# Patient Record
Sex: FEMALE | Race: WHITE
[De-identification: names, ages, dates, MRNs, and addresses within clinical notes are randomized per-mention and may not be internally consistent; named-entity substitution may affect disease eponyms.]

---

## 2021-06-23 ENCOUNTER — HOSPITAL ENCOUNTER (EMERGENCY)
Dept: HOSPITAL 41 - JD.ED | Age: 46
Discharge: HOME | End: 2021-06-23
Payer: MEDICAID

## 2021-06-23 DIAGNOSIS — N30.01: Primary | ICD-10-CM

## 2021-06-23 DIAGNOSIS — Z88.0: ICD-10-CM

## 2021-06-23 PROCEDURE — 36415 COLL VENOUS BLD VENIPUNCTURE: CPT

## 2021-06-23 PROCEDURE — 81001 URINALYSIS AUTO W/SCOPE: CPT

## 2021-06-23 PROCEDURE — 80053 COMPREHEN METABOLIC PANEL: CPT

## 2021-06-23 PROCEDURE — 99283 EMERGENCY DEPT VISIT LOW MDM: CPT

## 2021-06-23 PROCEDURE — 96365 THER/PROPH/DIAG IV INF INIT: CPT

## 2021-06-23 PROCEDURE — 87086 URINE CULTURE/COLONY COUNT: CPT

## 2021-06-23 NOTE — EDM.PDOC
ED HPI GENERAL MEDICAL PROBLEM





- General


Chief Complaint: Genitourinary Problem


Stated Complaint: UNABLE TO URINATE


Time Seen by Provider: 06/23/21 15:05


Source of Information: Reports: Patient, RN Notes Reviewed


History Limitations: Reports: No Limitations





- History of Present Illness


INITIAL COMMENTS - FREE TEXT/NARRATIVE: 





Patient is a 46-year-old female who presents to the ER for the evaluation of a 

possible UTI.  Patient notes she gets UTIs fairly frequently, the last one was 

roughly 2 months ago.  She states she is having dysuria, and only dribbles a 

little bit when she has to go to the bathroom.  Patient notes that she woke up 

early this morning, and that the road dorsal morning, she thinks she could be 

somewhat dehydrated as well however she was drinking Pedialyte all day in order 

to counteract the effects of being outside in the heat.  She does feel a bit 

dizzy or lightheaded, feels like her mouth is dry.  She is denying any fevers or

chills, cough or shortness of breath, nausea/vomiting/diarrhea.  Patient states 

it hurts quite a bit to urinate, so she became concerned and came to the ER.


  ** Groin


Pain Score (Numeric/FACES): 8





- Related Data


                                    Allergies











Allergy/AdvReac Type Severity Reaction Status Date / Time


 


amoxicillin Allergy Severe Anaphylactic Verified 06/23/21 15:08





   Shock  











Home Meds: 


                                    Home Meds





Cefdinir [Omnicef] 300 mg PO BID 7 Days #14 cap 06/23/21 [Rx]


Dextroamphetamine/Amphetamine [Adderall 20 mg Tablet] 1 tab PO TID 06/23/21 

[History]


LORazepam [Ativan] 0.5 mg PO BID PRN 06/23/21 [History]











Past Medical History


Genitourinary History: Reports: UTI, Recurrent


Psychiatric History: Reports: ADHD, Anxiety





- Past Surgical History


GI Surgical History: Reports: Appendectomy


Female  Surgical History: Reports: Hysterectomy


Musculoskeletal Surgical History: Reports: Carpal Tunnel, Other (See Below)


Other Musculoskeletal Surgeries/Procedures:: Foot Surgery





Social & Family History





- Tobacco Use


Tobacco Use Status *Q: Never Tobacco User





- Caffeine Use


Caffeine Use: Reports: Soda





- Recreational Drug Use


Recreational Drug Use: No





ED ROS GENERAL





- Review of Systems


Review Of Systems: Comprehensive ROS is negative, except as noted in HPI.





ED EXAM, RENAL/





- Physical Exam


Exam: See Below


Exam Limited By: No Limitations


General Appearance: Alert, WD/WN, No Apparent Distress


Respiratory/Chest: No Respiratory Distress, Lungs Clear, Normal Breath Sounds, 

No Accessory Muscle Use, Chest Non-Tender


Cardiovascular: Normal Peripheral Pulses, Regular Rate, Rhythm, No Edema


GI/Abdominal: Normal Bowel Sounds, Soft, No Distention, No Mass, Tender 

(suprapubic tenderness)


 (Female) Exam: Deferred


Extremities: Normal Inspection, Normal Capillary Refill


Neurological: Alert, Oriented, Normal Cognition, No Motor/Sensory Deficits


Psychiatric: Normal Affect, Normal Mood


Skin Exam: Warm, Dry, Intact, Normal Color, No Rash





Course





- Vital Signs


Last Recorded V/S: 


                                Last Vital Signs











Temp  97.4 F   06/23/21 15:05


 


Pulse  87   06/23/21 15:05


 


Resp  16   06/23/21 15:05


 


BP  127/74   06/23/21 15:05


 


Pulse Ox  97   06/23/21 15:05














- Orders/Labs/Meds


Orders: 


                               Active Orders 24 hr











 Category Date Time Status


 


 Peripheral IV Care [RC] .AS DIRECTED Care  06/23/21 15:18 Ordered


 


 Sodium Chloride 0.9% [Saline Flush] Med  06/23/21 15:18 Ordered





 10 ml FLUSH ASDIRECTED PRN   


 


 cefTRIAXone [Rocephin] 2 gm Med  06/23/21 15:54 Ordered





 Sodium Chloride 0.9% [Normal Saline] 100 ml   





 IV ONETIME   


 


 Peripheral IV Insertion Adult [OM.PC] Routine Oth  06/23/21 15:18 Ordered








                                Medication Orders





Ceftriaxone Sodium 2 gm/ (Sodium Chloride)  100 mls @ 200 mls/hr IV ONETIME ONE


   Stop: 06/23/21 16:23


   Last Admin: 06/23/21 16:02  Dose: 200 mls/hr


   Documented by: AMANDEEP


Sodium Chloride (Sodium Chloride 0.9% 10 Ml Syringe)  10 ml FLUSH ASDIRECTED PRN


   PRN Reason: Keep Vein Open


   Last Admin: 06/23/21 15:33  Dose: 10 ml


   Documented by: NABILA








Labs: 


                                Laboratory Tests











  06/23/21 06/23/21 Range/Units





  15:30 15:34 


 


Sodium   139  (136-145)  mEq/L


 


Potassium   3.3 L  (3.5-5.1)  mEq/L


 


Chloride   102  ()  mEq/L


 


Carbon Dioxide   27  (21-32)  mEq/L


 


Anion Gap   13.3  (5-15)  


 


BUN   9  (7-18)  mg/dL


 


Creatinine   0.9  (0.55-1.02)  mg/dL


 


Est Cr Clr Drug Dosing   73.12  mL/min


 


Estimated GFR (MDRD)   > 60  (>60)  mL/min


 


BUN/Creatinine Ratio   10.0 L  (14-18)  


 


Glucose   78  (70-99)  mg/dL


 


Calcium   8.3 L  (8.5-10.1)  mg/dL


 


Total Bilirubin   0.4  (0.2-1.0)  mg/dL


 


AST   27  (15-37)  U/L


 


ALT   23  (14-59)  U/L


 


Alkaline Phosphatase   75  ()  U/L


 


Total Protein   7.2  (6.4-8.2)  g/dl


 


Albumin   4.0  (3.4-5.0)  g/dl


 


Globulin   3.2  gm/dL


 


Albumin/Globulin Ratio   1.3  (1-2)  


 


Urine Color  Yellow   (Yellow)  


 


Urine Appearance  Cloudy H   (Clear)  


 


Urine pH  5.5   (5.0-8.0)  


 


Ur Specific Gravity  > or = 1.030   (1.005-1.030)  


 


Urine Protein  2+ H   (Negative)  


 


Urine Glucose (UA)  Negative   (Negative)  


 


Urine Ketones  1+ H   (Negative)  


 


Urine Occult Blood  2+ H   (Negative)  


 


Urine Nitrite  Positive H   (Negative)  


 


Urine Bilirubin  Negative   (Negative)  


 


Urine Urobilinogen  0.2   (0.2-1.0)  


 


Ur Leukocyte Esterase  2+ H   (Negative)  


 


Urine RBC  40-50 H   (0-5)  /hpf


 


Urine WBC  50-75 H   (0-5)  /hpf


 


Ur Squamous Epith Cells  5-10 H   (0-5)  /hpf


 


Urine Bacteria  Moderate H   (FEW)  /hpf


 


Urine Mucus  Moderate H   (FEW)  /hpf











Meds: 


Medications











Generic Name Dose Route Start Last Admin





  Trade Name Freq  PRN Reason Stop Dose Admin


 


Ceftriaxone Sodium 2 gm/  100 mls @ 200 mls/hr  06/23/21 15:54  06/23/21 16:02





  Sodium Chloride  IV  06/23/21 16:23  200 mls/hr





  ONETIME ONE   Administration


 


Sodium Chloride  10 ml  06/23/21 15:18  06/23/21 15:33





  Sodium Chloride 0.9% 10 Ml Syringe  FLUSH   10 ml





  ASDIRECTED PRN   Administration





  Keep Vein Open  














Discontinued Medications














Generic Name Dose Route Start Last Admin





  Trade Name Francine  PRN Reason Stop Dose Admin


 


Sodium Chloride  1,000 mls @ 999 mls/hr  06/23/21 15:18  06/23/21 15:33





  Normal Saline  IV  06/23/21 16:18  999 mls/hr





  ONETIME ONE   Administration


 


Phenazopyridine HCl  95 mg  06/23/21 15:19  06/23/21 15:33





  Phenazopyridine 95 Mg Tab  PO  06/23/21 15:20  95 mg





  ONETIME ONE   Administration














- Re-Assessments/Exams


Free Text/Narrative Re-Assessment/Exam: 





06/23/21 15:21


Patient presents to the ER for evaluation of her UTI-like symptoms, we will go 

ahead and get a urinalysis, patient states that she does feel fairly dry, IV 

will be started, will get a metabolic panel for examination, give her some IV 

fluids.  Once we get the preliminary urine results, we will try to give the 

patient some Rocephin for suspected UTI.





06/23/21 15:54


Urine has resulted, preliminarily, positive nitrites and 2+ leukocyte Estrace, 

have ordered IV Rocephin for management, we will keep the patient on some 

Omnicef for outpatient management.





Departure





- Departure


Time of Disposition: 15:59


Disposition: Home, Self-Care 01


Condition: Good


Clinical Impression: 


UTI (urinary tract infection)


Qualifiers:


 Urinary tract infection type: acute cystitis Hematuria presence: with hematuria

 Qualified Code(s): N30.01 - Acute cystitis with hematuria








- Discharge Information


*PRESCRIPTION DRUG MONITORING PROGRAM REVIEWED*: No


*COPY OF PRESCRIPTION DRUG MONITORING REPORT IN PATIENT DINORA: No


Prescriptions: 


Cefdinir [Omnicef] 300 mg PO BID 7 Days #14 cap


Instructions:  Urinary Tract Infection, Adult, Easy-to-Read


Forms:  ED Department Discharge


Additional Instructions: 


You have been evaluated in the ED for your urinary symptoms.





Your urinalysis was consistent with an acute urinary tract infection. Your urine

 was sent for culture, and you will be notified if you should need a change in 

your antibiotic. This may take up to 48 hours to result.





You may take AZO for urinary  pain relief. This is available over the counter, 

and can be attained at any retail store like [employer] or any pharmacy.  Please be

 aware that this medication will make your urine turn orange. You should only 

use this medication for a time period not longer than 72 hours.





You have been given a prescription for Omnicef (cefdinir), 300 mg 1 tablet 2 

times a day for 7 days.  Please note that the antibiotics can take up to 48 

hours to start working.  This medication was electronically sent to the [facility] located near [facility].





Please increase your oral fluid intake and try to stay adequately hydrated.





Please return to the ED if your symptoms change or worsen.





Sepsis Event Note (ED)





- Evaluation


Sepsis Screening Result: No Definite Risk





- Focused Exam


Vital Signs: 


                                   Vital Signs











  Temp Pulse Resp BP Pulse Ox


 


 06/23/21 15:05  97.4 F  87  16  127/74  97














- My Orders


Last 24 Hours: 


My Active Orders





06/23/21 15:18


Peripheral IV Care [RC] .AS DIRECTED 


Sodium Chloride 0.9% [Saline Flush]   10 ml FLUSH ASDIRECTED PRN 


Peripheral IV Insertion Adult [OM.PC] Routine 





06/23/21 15:54


cefTRIAXone [Rocephin] 2 gm   Sodium Chloride 0.9% [Normal Saline] 100 ml IV 

ONETIME 














- Assessment/Plan


Last 24 Hours: 


My Active Orders





06/23/21 15:18


Peripheral IV Care [RC] .AS DIRECTED 


Sodium Chloride 0.9% [Saline Flush]   10 ml FLUSH ASDIRECTED PRN 


Peripheral IV Insertion Adult [OM.PC] Routine 





06/23/21 15:54


cefTRIAXone [Rocephin] 2 gm   Sodium Chloride 0.9% [Normal Saline] 100 ml IV 

ONETIME

## 2021-06-25 ENCOUNTER — HOSPITAL ENCOUNTER (OUTPATIENT)
Dept: HOSPITAL 41 - JD.ED | Age: 46
Setting detail: OBSERVATION
LOS: 2 days | Discharge: HOME | End: 2021-06-27
Payer: MEDICAID

## 2021-06-25 DIAGNOSIS — N20.0: ICD-10-CM

## 2021-06-25 DIAGNOSIS — Z98.890: ICD-10-CM

## 2021-06-25 DIAGNOSIS — Z90.49: ICD-10-CM

## 2021-06-25 DIAGNOSIS — Z79.899: ICD-10-CM

## 2021-06-25 DIAGNOSIS — N39.0: Primary | ICD-10-CM

## 2021-06-25 DIAGNOSIS — K59.00: ICD-10-CM

## 2021-06-25 DIAGNOSIS — Z88.1: ICD-10-CM

## 2021-06-25 DIAGNOSIS — Z20.822: ICD-10-CM

## 2021-06-25 PROCEDURE — 86140 C-REACTIVE PROTEIN: CPT

## 2021-06-25 PROCEDURE — U0002 COVID-19 LAB TEST NON-CDC: HCPCS

## 2021-06-25 PROCEDURE — 85007 BL SMEAR W/DIFF WBC COUNT: CPT

## 2021-06-25 PROCEDURE — 83735 ASSAY OF MAGNESIUM: CPT

## 2021-06-25 PROCEDURE — 36415 COLL VENOUS BLD VENIPUNCTURE: CPT

## 2021-06-25 PROCEDURE — 96365 THER/PROPH/DIAG IV INF INIT: CPT

## 2021-06-25 PROCEDURE — 96366 THER/PROPH/DIAG IV INF ADDON: CPT

## 2021-06-25 PROCEDURE — 85027 COMPLETE CBC AUTOMATED: CPT

## 2021-06-25 PROCEDURE — 83690 ASSAY OF LIPASE: CPT

## 2021-06-25 PROCEDURE — 80053 COMPREHEN METABOLIC PANEL: CPT

## 2021-06-25 PROCEDURE — 96376 TX/PRO/DX INJ SAME DRUG ADON: CPT

## 2021-06-25 PROCEDURE — G0378 HOSPITAL OBSERVATION PER HR: HCPCS

## 2021-06-25 PROCEDURE — 87635 SARS-COV-2 COVID-19 AMP PRB: CPT

## 2021-06-25 PROCEDURE — 85025 COMPLETE CBC W/AUTO DIFF WBC: CPT

## 2021-06-25 PROCEDURE — 80048 BASIC METABOLIC PNL TOTAL CA: CPT

## 2021-06-25 PROCEDURE — 74176 CT ABD & PELVIS W/O CONTRAST: CPT

## 2021-06-25 PROCEDURE — 96375 TX/PRO/DX INJ NEW DRUG ADDON: CPT

## 2021-06-25 PROCEDURE — 87086 URINE CULTURE/COLONY COUNT: CPT

## 2021-06-25 PROCEDURE — 99285 EMERGENCY DEPT VISIT HI MDM: CPT

## 2021-06-25 PROCEDURE — 81001 URINALYSIS AUTO W/SCOPE: CPT

## 2021-06-25 PROCEDURE — 76770 US EXAM ABDO BACK WALL COMP: CPT

## 2021-06-25 RX ADMIN — Medication SCH MG: at 18:32

## 2021-06-25 RX ADMIN — SODIUM CHLORIDE PRN MG: 9 INJECTION, SOLUTION INTRAVENOUS at 15:43

## 2021-06-25 NOTE — CT
CT abdomen and pelvis

 

Technique: Multiple axial sections were obtained from above the dome 

of the diaphragm inferiorly through the pubic symphysis.  Intravenous 

and oral contrast were not utilized.  Reconstructed coronal and 

sagittal images were obtained.

 

Comparison: No prior abdominal imaging is available.

 

Findings: Small nonobstructing calculus is seen within the mid right 

kidney measuring less than 1 cm.  Left kidney shows no abnormal 

calcifications.  No ureteral dilatation or ureteral stone is seen.

 

Visualized lung bases show nothing acute.

 

Noncontrast appearance of the liver shows no focal abnormality.  

Spleen is normal in size.  Gallbladder contains no calcified 

gallstones.  Pancreas shows no discrete abnormality.  Adrenal glands 

show no nodule.

 

Abdominal aorta shows no aneurysm.  No retroperitoneal adenopathy or 

mesenteric abnormalities are noted.  No pelvic mass or adenopathy is 

seen.  Calcifications are noted within the pelvis which are believed 

to represent phleboliths.

 

Appendix is not definitely visualized.

 

Prominent increased stool is seen within the colon.

 

Bone window settings were reviewed which show mild scattered 

degenerative change with disc space narrowing.  There is vacuum 

phenomena seen within the L4-5 apophyseal joints as well as within the

 L5-S1 disc.

 

Impression:

1.  Small nonobstructing stone within the mid right kidney.

2.  No ureteral dilatation or ureteral stone is seen.

3.  Prominent increased stool is seen throughout the colon.

4.  Other incidental findings as noted above.

 

Diagnostic code #2

## 2021-06-25 NOTE — PCM.HP.2
<Dung Luna - Last Filed: 06/25/21 15:44>





H&P History of Present Illness





- General


Date of Service: 06/25/21


Admit Problem/Dx: 


UTI


Source of Information: Patient, Old Records, Provider, RN, RN Notes Reviewed





- History of Present Illness


Initial Comments - Free Text/Narative: 


This is a 46-year-old female who presents to ED on 6/25/2021 with concerns over 

a UTI that is not getting better.  She was seen in our ED on 6/23/2021 and 

started on Omnicef.  Urine cultures from that visit are thus far growing 50-

100,000 CFU's of gram-negative rods.  On this visit patient reports that pain 

has been getting worse.  She denies any history of renal stones and states the 

pain is only on her left side.  She does note continuing dysuria when trying to 

void.  Denies any fever or chills but states she just does not feel well.





ED temp was 36.9 Celsius.  Pulse 77.  Respirations 19.  Blood pressure 140/89.  

Pulse ox 99% on room air.  Labs were obtained: There is no leukocytosis as WBC 

is 9.27.  Hemoglobin is 12.4.  Hematocrit 37.5.  Platelets are 238,000.  

Neutrophils are elevated at 70%.  There is 2% band neutrophils noted.  Sodium is

141.  Potassium 3.8.  Chloride 106.  Carbon dioxide 26.  Anion gap is 12.8.  BUN

is 9.  Creatinine 0.8.  GFR greater than 60.  Glucose is 103.  Calcium 8.1.  

Total bilirubin 0.4.  AST is 38, ALT 28, alkaline phosphatase 78.  CRP 0.5.  

Albumin is 3.6.  Lipase is 86.  UA is obtained and is dark yellow and cloudy.  

It is concentrated with 1+ protein, 1+ bilirubin, negative leukocyte esterase, 

20-30 WBCs, 5-10 urine epithelial cells, moderate calcium oxalate crystals and 

moderate urine bacteria.  Many urine mucus is also noted.  Abdominal pelvis CT 

scan is obtained showing small nonobstructing stone within the mid right kidney 

but no evidence of ureteral dilation or ureteral stone seen.  She also has 

prominent stool throughout her colon.  Repeat urine culture was ordered.  She is

given Dilaudid for pain and Zofran for nausea.  She started on IV fluids and 

given a single dose of 500 mg Levaquin.





She carries a history of recurrent UTIs, ADHD and anxiety.  She was never 

smoker.  She is not from around the area as she is here visiting [place].  She 

subsequently admitted to the medical floor observation status for management of 

her pyelonephritis and constipation.





  ** Abdominal


Pain Score (Numeric/FACES): 8





- Related Data


Allergies/Adverse Reactions: 


                                    Allergies











Allergy/AdvReac Type Severity Reaction Status Date / Time


 


amoxicillin Allergy Severe Anaphylactic Verified 06/25/21 15:06





   Shock  











Home Medications: 


                                    Home Meds





Cefdinir [Omnicef] 300 mg PO BID 7 Days #14 cap 06/23/21 [Rx]


Dextroamphetamine/Amphetamine [Adderall 20 mg Tablet] 1 tab PO TID 06/23/21 

[History]


Fluconazole [Diflucan] 150 mg PO ASDIRECTED #1 tablet 06/23/21 [Rx]


LORazepam [Ativan] 0.5 mg PO BID PRN 06/23/21 [History]











Past Medical History


Genitourinary History: Reports: UTI, Recurrent


Psychiatric History: Reports: ADHD, Anxiety





- Past Surgical History


GI Surgical History: Reports: Appendectomy


Female  Surgical History: Reports: Hysterectomy


Musculoskeletal Surgical History: Reports: Carpal Tunnel, Other (See Below)


Other Musculoskeletal Surgeries/Procedures:: Foot Surgery





Social & Family History





- Tobacco Use


Tobacco Use Status *Q: Never Tobacco User


Second Hand Smoke Exposure: No





- Caffeine Use


Caffeine Use: Reports: Soda





- Alcohol Use


Days Per Week of Alcohol Use: 7


Number of Drinks Per Day: 1


Total Drinks Per Week: 7





- Recreational Drug Use


Recreational Drug Use: Yes





H&P Review of Systems





- Review of Systems:


Review Of Systems: See Below


General: Reports: Malaise, Weakness, Fatigue.  Denies: Fever, Chills


HEENT: Reports: No Symptoms.  Denies: Headaches, Sore Throat


Pulmonary: Reports: No Symptoms.  Denies: Shortness of Breath, Wheezing, 

Pleuritic Chest Pain, Cough, Sputum


Cardiovascular: Reports: No Symptoms.  Denies: Chest Pain, Palpitations, Dyspnea

 on Exertion, Edema


Gastrointestinal: Reports: Abdominal Pain (generalized ), Constipation (chronic 

), Nausea, Vomiting


Genitourinary: Reports: Dysuria, Burning, Pain, Urgency, Retention, Flank Pain


Musculoskeletal: Reports: No Symptoms


Skin: Reports: No Symptoms.  Denies: Cyanosis


Psychiatric: Reports: No Symptoms.  Denies: Confusion


Neurological: Reports: No Symptoms


Hematologic/Lymphatic: Reports: No Symptoms


Immunologic: Reports: No Symptoms





Exam





- Exam


Exam: See Below





- Vital Signs


Vital Signs: 


                                Last Vital Signs











Temp  98.4 F   06/25/21 09:37


 


Pulse  77   06/25/21 09:37


 


Resp  19   06/25/21 09:37


 


BP  140/89   06/25/21 09:37


 


Pulse Ox  99   06/25/21 09:37











Weight: 58.967 kg





- Exam


Quality Assessment: DVT Prophylaxis.  No: Supplemental Oxygen, Urinary Catheter


General: Alert, Oriented, Cooperative, Mild Distress


HEENT: Conjunctiva Clear, EACs Clear, EOMI, Mucosa Moist & Pink, Posterior 

Pharynx Clear


Neck: Supple, Trachea Midline


Lungs: Clear to Auscultation, Normal Respiratory Effort


Cardiovascular: Regular Rate, Regular Rhythm


GI/Abdominal Exam: Normal Bowel Sounds, Soft, Tender


 (Female) Exam: Deferred


Rectal (Female) Exam: Deferred


Back Exam: Normal Inspection, Full Range of Motion.  No: CVA Tenderness (L), CVA

 Tenderness (R)


Extremities: Normal Inspection, Normal Range of Motion, Non-Tender, No Pedal 

Edema, Normal Capillary Refill


Peripheral Pulses: 2+: Radial (L), Radial (R), Dorsalis Pedis (L), Dorsalis 

Pedis (R)


Skin: Warm, Dry, Intact


Neurological: Cranial Nerves Intact (Grossly )





- Patient Data


Lab Results Last 24 hrs: 


                         Laboratory Results - last 24 hr











  06/25/21 06/25/21 06/25/21 Range/Units





  09:40 09:50 09:50 


 


WBC   9.27   (3.98-10.04)  K/mm3


 


RBC   3.96 L   (3.98-5.22)  M/mm3


 


Hgb   12.4   (11.2-15.7)  gm/dl


 


Hct   37.5   (34.1-44.9)  %


 


MCV   94.7   (79.4-94.8)  fl


 


MCH   31.3   (25.6-32.2)  pg


 


MCHC   33.1   (32.2-35.5)  g/dl


 


RDW Std Deviation   44.1   (36.4-46.3)  fL


 


Plt Count   238   (182-369)  K/mm3


 


MPV   10.8   (9.4-12.3)  fl


 


Neutrophils % (Manual)   70 H   (40-60)  %


 


Band Neutrophils %   2   (0-10)  %


 


Lymphocytes % (Manual)   17 L   (20-40)  %


 


Atypical Lymphs %   0   %


 


Monocytes % (Manual)   8   (2-10)  %


 


Eosinophils % (Manual)   2   (0.7-5.8)  %


 


Basophils % (Manual)   1   (0.1-1.2)  


 


Platelet Estimate   Adequate   


 


Plt Morphology Comment   Normal   


 


RBC Morph Comment   Normal   


 


Sodium    141  (136-145)  mEq/L


 


Potassium    3.8  (3.5-5.1)  mEq/L


 


Chloride    106  ()  mEq/L


 


Carbon Dioxide    26  (21-32)  mEq/L


 


Anion Gap    12.8  (5-15)  


 


BUN    9  (7-18)  mg/dL


 


Creatinine    0.8  (0.55-1.02)  mg/dL


 


Est Cr Clr Drug Dosing    81.80  mL/min


 


Estimated GFR (MDRD)    > 60  (>60)  mL/min


 


BUN/Creatinine Ratio    11.3 L  (14-18)  


 


Glucose    103 H  (70-99)  mg/dL


 


Calcium    8.1 L  (8.5-10.1)  mg/dL


 


Total Bilirubin    0.4  (0.2-1.0)  mg/dL


 


AST    38 H  (15-37)  U/L


 


ALT    28  (14-59)  U/L


 


Alkaline Phosphatase    78  ()  U/L


 


C-Reactive Protein    0.5  (<1.0)  mg/dL


 


Total Protein    6.8  (6.4-8.2)  g/dl


 


Albumin    3.6  (3.4-5.0)  g/dl


 


Globulin    3.2  gm/dL


 


Albumin/Globulin Ratio    1.1  (1-2)  


 


Lipase    86  ()  U/L


 


Urine Color  Dark yellow    (Yellow)  


 


Urine Appearance  Slt cloudy H    (Clear)  


 


Urine pH  5.5    (5.0-8.0)  


 


Ur Specific Gravity  > or = 1.030    (1.005-1.030)  


 


Urine Protein  1+ H    (Negative)  


 


Urine Glucose (UA)  Negative    (Negative)  


 


Urine Ketones  Negative    (Negative)  


 


Urine Occult Blood  Negative    (Negative)  


 


Urine Nitrite  Negative    (Negative)  


 


Urine Bilirubin  1+ H    (Negative)  


 


Urine Urobilinogen  0.2    (0.2-1.0)  


 


Ur Leukocyte Esterase  Negative    (Negative)  


 


Urine RBC  0-5    (0-5)  /hpf


 


Urine WBC  20-30 H    (0-5)  /hpf


 


Ur Epithelial Cells  5-10 H    (0-5)  /hpf


 


Ur Renal Epithelial Cell  0-5    (0-5)  /hpf


 


Calcium Oxalate Crystal  Moderate H    (NONE)  


 


Urine Bacteria  Moderate H    (FEW)  /hpf


 


Urine Mucus  Many H    (FEW)  /hpf











Result Diagrams: 


                                 06/25/21 09:50





                                 06/25/21 09:50





Sepsis Event Note





- Evaluation


Sepsis Screening Result: No Definite Risk





- Focused Exam


Vital Signs: 


                                   Vital Signs











  Temp Pulse Resp BP Pulse Ox


 


 06/25/21 09:37  98.4 F  77  19  140/89  99














- Problem List


(1) Failure of outpatient treatment


SNOMED Code(s): 081435161


   ICD Code: Z78.9 - OTHER SPECIFIED HEALTH STATUS   Status: Acute   Priority: 

High   Current Visit: Yes   





(2) UTI (urinary tract infection)


SNOMED Code(s): 78289365


   ICD Code: N39.0 - URINARY TRACT INFECTION, SITE NOT SPECIFIED   Status: Acute

   Priority: High   Current Visit: Yes   


Qualifiers: 


   Urinary tract infection type: acute cystitis   Hematuria presence: with 

hematuria   Qualified Code(s): N30.01 - Acute cystitis with hematuria   





(3) Constipation


SNOMED Code(s): 74640594


   ICD Code: K59.00 - CONSTIPATION, UNSPECIFIED   Status: Acute   Priority: High

   Current Visit: Yes   


Qualifiers: 


   Constipation type: unspecified constipation type   Qualified Code(s): K59.00 

- Constipation, unspecified   





(4) ADHD


SNOMED Code(s): 926041532


   ICD Code: F90.9 - ATTENTION-DEFICIT HYPERACTIVITY DISORDER, UNSPECIFIED TYPE 

  Status: Chronic   Priority: Low   Current Visit: No   


Qualifiers: 


   Attention deficit-hyperactivity disorder type: unspecified   Qualified 

Code(s): F90.9 - Attention-deficit hyperactivity disorder, unspecified type   





(5) Anxiety


SNOMED Code(s): 07843815


   ICD Code: F41.9 - ANXIETY DISORDER, UNSPECIFIED   Status: Chronic   Priority:

 Low   Current Visit: No   





(6) Pyelonephritis of left kidney


SNOMED Code(s): 85624029


   ICD Code: N12 - TUBULO-INTERSTITIAL NEPHRITIS, NOT SPCF AS ACUTE OR CHRONIC  

 Status: Acute   Priority: High   Current Visit: Yes   





(7) Renal stone


SNOMED Code(s): 46722586


   ICD Code: N20.0 - CALCULUS OF KIDNEY   Status: Acute   Priority: Medium   

Current Visit: Yes   


Problem List Initiated/Reviewed/Updated: Yes


Orders Last 24hrs: 


                               Active Orders 24 hr











 Category Date Time Status


 


 CULTURE URINE [MREF] Stat Lab  06/25/21 13:04 Ordered


 


 Levofloxacin/Dextrose 5%-Water [Levaquin in D5W 500 MG/ Med  06/25/21 13:02 

Active





 100 ML] 500 mg   





 Premix Bag 1 bag   





 IV ONETIME   


 


 Sodium Chloride 0.9% [Normal Saline] 1,000 ml Med  06/25/21 10:30 Active





 IV ASDIRECTED   








                                Medication Orders





Sodium Chloride (Normal Saline)  1,000 mls @ 150 mls/hr IV ASDIRECTED HERNANDEZ


   Last Admin: 06/25/21 10:31  Dose: 150 mls/hr


   Documented by: BRISA


Levofloxacin/Dextrose 500 mg/ (Premix)  100 mls @ 100 mls/hr IV ONETIME ONE


   Stop: 06/25/21 14:01








Assessment/Plan Comment:: 


Assessment - day of admission 6/25/2021


* 46-year-old female who presents to ED on 6/25/2021 with concerns over a UTI 

  that is not getting better


* History of recurrent UTIs, ADHD and anxiety


* Visiting area from [place]


* Seen in our ED on 6/23/2021 and started on Omnicef.  Urine cultures from that 

  visit are thus far growing ,000 CFU's of gram-negative rods.  


* Pain is only on her left side.  


* Denies any fever or chills but states she just does not feel well


* Labs were obtained: 


   *  WBC is 9.27.  


   * Hemoglobin is 12.4.  


   * Hematocrit 37.5.  


   * Platelets are 238,000.  


   * Neutrophils are elevated at 70%.  There is 2% band neutrophils noted.  


   * Sodium is 141.  


   * Potassium 3.8.  


   * Chloride 106.  


   * Carbon dioxide 26.  


   * Anion gap is 12.8.  


   * BUN is 9.  Creatinine 0.8.  GFR greater than 60.  


   * Glucose is 103.  


   * Calcium 8.1.  


   * Total bilirubin 0.4. 


   *  AST is 38, ALT 28, alkaline phosphatase 78.  


   * CRP 0.5.  


   * Albumin is 3.6.  


   * Lipase is 86.  


   * UA obtained: dark yellow and cloudy, concentrated with 1+ protein, 1+ 

     bilirubin, negative leukocyte esterase, 20-30 WBCs, 5-10 urine epithelial 

     cells, moderate calcium oxalate crystals, moderate urine bacteria, many 

     urine mucus is also noted.  


   * Abdominal pelvis CT scan: small nonobstructing stone within the mid right 

     kidney but no evidence of ureteral dilation or ureteral stone seen.  She 

     also has prominent stool throughout her colon.  


   * Repeat urine culture was ordered. 


   * She is given Dilaudid for pain and Zofran for nausea; started on IV fluids 

     and given a single dose of 500 mg Levaquin.


   * Admitted to the medical floor observation status for management of her 

     pyelonephritis and constipation.





PLAN:


UTI (urinary tract infection)


Pyelonephritis of left kidney


Failure of outpatient treatment


* Continue 500mg daily Levaquin


* Pain medications as ordered


* IV fluids as ordered


* Start Pyridium


* Blood cultures if patient becomes febrile


* Await urine cultures from 6/23/2021 and repeat cultures


* Antiemetics PRN 


* Recheck daily labs





 Constipation


* Scheduled Senna plus


* PRN MiraLax


* Ambulate





Renal stone


* <1cm and non-obstructing


* PCP to monitor





ADHD


Anxiety


* No acute concerns


* Continue home PRN and scheduled meds





Code status: Full code


PCP: From out of town


DVT prophylaxis:


Social: Patient is from [place] in the area visiting [place]


Disposition: Patient admitted to med surgical unit for management of U

TI/pyelonephritis and constipation.  Anticipated length of stay 1 to 2 days.





- Mortality Measure


Prognosis:: Good





<Osmany Marc - Last Filed: 06/25/21 16:45>





H&P History of Present Illness





- General


Admit Problem/Dx: 


                           Admission Diagnosis/Problem





Admission Diagnosis/Problem      UTI (urinary tract infection), uncomplicated











Exam





- Vital Signs


Vital Signs: 


                                Last Vital Signs











Temp  36.7 C   06/25/21 14:34


 


Pulse  72   06/25/21 14:34


 


Resp  18   06/25/21 14:34


 


BP  110/74   06/25/21 14:34


 


Pulse Ox  97   06/25/21 14:34














- Patient Data


Lab Results Last 24 hrs: 


                         Laboratory Results - last 24 hr











  06/25/21 06/25/21 06/25/21 Range/Units





  09:40 09:50 09:50 


 


WBC   9.27   (3.98-10.04)  K/mm3


 


RBC   3.96 L   (3.98-5.22)  M/mm3


 


Hgb   12.4   (11.2-15.7)  gm/dl


 


Hct   37.5   (34.1-44.9)  %


 


MCV   94.7   (79.4-94.8)  fl


 


MCH   31.3   (25.6-32.2)  pg


 


MCHC   33.1   (32.2-35.5)  g/dl


 


RDW Std Deviation   44.1   (36.4-46.3)  fL


 


Plt Count   238   (182-369)  K/mm3


 


MPV   10.8   (9.4-12.3)  fl


 


Neutrophils % (Manual)   70 H   (40-60)  %


 


Band Neutrophils %   2   (0-10)  %


 


Lymphocytes % (Manual)   17 L   (20-40)  %


 


Atypical Lymphs %   0   %


 


Monocytes % (Manual)   8   (2-10)  %


 


Eosinophils % (Manual)   2   (0.7-5.8)  %


 


Basophils % (Manual)   1   (0.1-1.2)  


 


Platelet Estimate   Adequate   


 


Plt Morphology Comment   Normal   


 


RBC Morph Comment   Normal   


 


Sodium    141  (136-145)  mEq/L


 


Potassium    3.8  (3.5-5.1)  mEq/L


 


Chloride    106  ()  mEq/L


 


Carbon Dioxide    26  (21-32)  mEq/L


 


Anion Gap    12.8  (5-15)  


 


BUN    9  (7-18)  mg/dL


 


Creatinine    0.8  (0.55-1.02)  mg/dL


 


Est Cr Clr Drug Dosing    81.80  mL/min


 


Estimated GFR (MDRD)    > 60  (>60)  mL/min


 


BUN/Creatinine Ratio    11.3 L  (14-18)  


 


Glucose    103 H  (70-99)  mg/dL


 


Calcium    8.1 L  (8.5-10.1)  mg/dL


 


Total Bilirubin    0.4  (0.2-1.0)  mg/dL


 


AST    38 H  (15-37)  U/L


 


ALT    28  (14-59)  U/L


 


Alkaline Phosphatase    78  ()  U/L


 


C-Reactive Protein    0.5  (<1.0)  mg/dL


 


Total Protein    6.8  (6.4-8.2)  g/dl


 


Albumin    3.6  (3.4-5.0)  g/dl


 


Globulin    3.2  gm/dL


 


Albumin/Globulin Ratio    1.1  (1-2)  


 


Lipase    86  ()  U/L


 


Urine Color  Dark yellow    (Yellow)  


 


Urine Appearance  Slt cloudy H    (Clear)  


 


Urine pH  5.5    (5.0-8.0)  


 


Ur Specific Gravity  > or = 1.030    (1.005-1.030)  


 


Urine Protein  1+ H    (Negative)  


 


Urine Glucose (UA)  Negative    (Negative)  


 


Urine Ketones  Negative    (Negative)  


 


Urine Occult Blood  Negative    (Negative)  


 


Urine Nitrite  Negative    (Negative)  


 


Urine Bilirubin  1+ H    (Negative)  


 


Urine Urobilinogen  0.2    (0.2-1.0)  


 


Ur Leukocyte Esterase  Negative    (Negative)  


 


Urine RBC  0-5    (0-5)  /hpf


 


Urine WBC  20-30 H    (0-5)  /hpf


 


Ur Epithelial Cells  5-10 H    (0-5)  /hpf


 


Ur Renal Epithelial Cell  0-5    (0-5)  /hpf


 


Calcium Oxalate Crystal  Moderate H    (NONE)  


 


Urine Bacteria  Moderate H    (FEW)  /hpf


 


Urine Mucus  Many H    (FEW)  /hpf


 


SARS-CoV-2 RNA (TONIA)     (NEGATIVE)  














  06/25/21 Range/Units





  13:19 


 


WBC   (3.98-10.04)  K/mm3


 


RBC   (3.98-5.22)  M/mm3


 


Hgb   (11.2-15.7)  gm/dl


 


Hct   (34.1-44.9)  %


 


MCV   (79.4-94.8)  fl


 


MCH   (25.6-32.2)  pg


 


MCHC   (32.2-35.5)  g/dl


 


RDW Std Deviation   (36.4-46.3)  fL


 


Plt Count   (182-369)  K/mm3


 


MPV   (9.4-12.3)  fl


 


Neutrophils % (Manual)   (40-60)  %


 


Band Neutrophils %   (0-10)  %


 


Lymphocytes % (Manual)   (20-40)  %


 


Atypical Lymphs %   %


 


Monocytes % (Manual)   (2-10)  %


 


Eosinophils % (Manual)   (0.7-5.8)  %


 


Basophils % (Manual)   (0.1-1.2)  


 


Platelet Estimate   


 


Plt Morphology Comment   


 


RBC Morph Comment   


 


Sodium   (136-145)  mEq/L


 


Potassium   (3.5-5.1)  mEq/L


 


Chloride   ()  mEq/L


 


Carbon Dioxide   (21-32)  mEq/L


 


Anion Gap   (5-15)  


 


BUN   (7-18)  mg/dL


 


Creatinine   (0.55-1.02)  mg/dL


 


Est Cr Clr Drug Dosing   mL/min


 


Estimated GFR (MDRD)   (>60)  mL/min


 


BUN/Creatinine Ratio   (14-18)  


 


Glucose   (70-99)  mg/dL


 


Calcium   (8.5-10.1)  mg/dL


 


Total Bilirubin   (0.2-1.0)  mg/dL


 


AST   (15-37)  U/L


 


ALT   (14-59)  U/L


 


Alkaline Phosphatase   ()  U/L


 


C-Reactive Protein   (<1.0)  mg/dL


 


Total Protein   (6.4-8.2)  g/dl


 


Albumin   (3.4-5.0)  g/dl


 


Globulin   gm/dL


 


Albumin/Globulin Ratio   (1-2)  


 


Lipase   ()  U/L


 


Urine Color   (Yellow)  


 


Urine Appearance   (Clear)  


 


Urine pH   (5.0-8.0)  


 


Ur Specific Gravity   (1.005-1.030)  


 


Urine Protein   (Negative)  


 


Urine Glucose (UA)   (Negative)  


 


Urine Ketones   (Negative)  


 


Urine Occult Blood   (Negative)  


 


Urine Nitrite   (Negative)  


 


Urine Bilirubin   (Negative)  


 


Urine Urobilinogen   (0.2-1.0)  


 


Ur Leukocyte Esterase   (Negative)  


 


Urine RBC   (0-5)  /hpf


 


Urine WBC   (0-5)  /hpf


 


Ur Epithelial Cells   (0-5)  /hpf


 


Ur Renal Epithelial Cell   (0-5)  /hpf


 


Calcium Oxalate Crystal   (NONE)  


 


Urine Bacteria   (FEW)  /hpf


 


Urine Mucus   (FEW)  /hpf


 


SARS-CoV-2 RNA (TONIA)  Negative  (NEGATIVE)  











Result Diagrams: 


                                 06/25/21 09:50





                                 06/25/21 09:50





Sepsis Event Note





- Focused Exam


Vital Signs: 


                                   Vital Signs











  Temp Temp Pulse Pulse Resp BP BP


 


 06/25/21 14:34  36.7 C   72   18  110/74 


 


 06/25/21 09:37   36.9 C   77  19   140/89














  Pulse Ox


 


 06/25/21 14:34  97


 


 06/25/21 09:37  99











Orders Last 24hrs: 


                               Active Orders 24 hr











 Category Date Time Status


 


 Patient Status [ADT] Routine ADT  06/25/21 13:46 Active


 


 Height and Weight [RC] 06 Care  06/25/21 14:49 Active


 


 Intake and Output [RC] 04,16 Care  06/25/21 14:49 Active


 


 Oxygen Therapy [RC] ASDIRECTED Care  06/25/21 14:49 Active


 


 Pulse Oximetry [RC] PRN Care  06/25/21 14:49 Active


 


 Up ad Alejandrina [RC] ASDIRECTED Care  06/25/21 14:49 Active


 


 Vital Signs [RC] 03,09,15,21 Care  06/25/21 14:49 Active


 


 Regular Diet [DIET] Diet  06/25/21 Dinner Active


 


 BASIC METABOLIC PANEL,BMP [CHEM] AM Lab  06/26/21 05:11 Ordered


 


 BASIC METABOLIC PANEL,BMP [CHEM] AM Lab  06/27/21 05:11 Ordered


 


 BASIC METABOLIC PANEL,BMP [CHEM] AM Lab  06/28/21 05:11 Ordered


 


 BASIC METABOLIC PANEL,BMP [CHEM] AM Lab  06/29/21 05:11 Ordered


 


 C-REACTIVE PROTEIN [CHEM] AM Lab  06/26/21 05:11 Ordered


 


 C-REACTIVE PROTEIN [CHEM] AM Lab  06/27/21 05:11 Ordered


 


 C-REACTIVE PROTEIN [CHEM] AM Lab  06/28/21 05:11 Ordered


 


 C-REACTIVE PROTEIN [CHEM] AM Lab  06/29/21 05:11 Ordered


 


 CBC WITH AUTO DIFF [HEME] AM Lab  06/26/21 05:11 Ordered


 


 CBC WITH AUTO DIFF [HEME] AM Lab  06/27/21 05:11 Ordered


 


 CBC WITH AUTO DIFF [HEME] AM Lab  06/28/21 05:11 Ordered


 


 CBC WITH AUTO DIFF [HEME] AM Lab  06/29/21 05:11 Ordered


 


 CULTURE URINE [MREF] Stat Lab  06/25/21 09:40 Received


 


 MAGNESIUM [CHEM] AM Lab  06/26/21 05:11 Ordered


 


 MAGNESIUM [CHEM] AM Lab  06/27/21 05:11 Ordered


 


 MAGNESIUM [CHEM] AM Lab  06/28/21 05:11 Ordered


 


 MAGNESIUM [CHEM] AM Lab  06/29/21 05:11 Ordered


 


 Acetaminophen [TylenoL] Med  06/25/21 14:49 Active





 650 mg PO Q4H PRN   


 


 Dextroamphetamine/Amphetamine [Adderall 20 mg Tablet] Med  06/25/21 15:00 

Pending





 1 tab PO TID   


 


 Docusate Sodium/Sennosides [Senna Plus] Med  06/25/21 15:00 Active





 1 tab PO BID   


 


 Fluconazole [Diflucan] Med  06/25/21 15:00 Pending





 150 mg PO ASDIRECTED   


 


 HYDROmorphone [Dilaudid] Med  06/25/21 14:49 Active





 0.5 mg IVPUSH Q2H PRN   


 


 LORazepam [Ativan] Med  06/25/21 14:53 Pending





 0.5 mg PO BID PRN   


 


 Levofloxacin/Dextrose 5%-Water [Levaquin in D5W 500 MG/ Med  06/26/21 13:00 

Active





 100 ML] 500 mg   





 Premix Bag 1 bag   





 IV Q24H   


 


 Ondansetron [Zofran] Med  06/25/21 14:49 Active





 4 mg IV Q6H PRN   


 


 Phenazopyridine [Urinary Pain Relief] Med  06/25/21 19:00 Active





 95 mg PO TIDPC   


 


 Sodium Chloride 0.9% [Normal Saline] 1,000 ml Med  06/25/21 10:30 Active





 IV ASDIRECTED   


 


 oxyCODONE Med  06/25/21 14:49 Active





 10 mg PO Q4H PRN   


 


 polyethylene glycoL 3350 [MiraLAX] Med  06/25/21 14:44 Active





 17 gm PO BID PRN   


 


 Resuscitation Status Routine Resus Stat  06/25/21 14:49 Ordered








                                Medication Orders





Acetaminophen (Acetaminophen 325 Mg Tab)  650 mg PO Q4H PRN


   PRN Reason: Pain (Mild 1-3)/fever


Fluconazole (Fluconazole 150 Mg Tab)  150 mg PO ASDIRECTED HERNANDEZ


Hydromorphone HCl (Hydromorphone 0.5 Mg/0.5 Ml Syringe)  0.5 mg IVPUSH Q2H PRN


   PRN Reason: Pain (severe 7-10)


   Last Admin: 06/25/21 15:52  Dose: 0.5 mg


   Documented by: MUITDAM


Sodium Chloride (Normal Saline)  1,000 mls @ 150 mls/hr IV ASDIRECTED HERNANDEZ


   Last Admin: 06/25/21 10:31  Dose: 150 mls/hr


   Documented by: BRISA


Levofloxacin/Dextrose 500 mg/ (Premix)  100 mls @ 100 mls/hr IV Q24H HERNANDEZ


Lorazepam (Lorazepam 0.5 Mg Tab)  0.5 mg PO BID PRN


   PRN Reason: Anxiety


Non-Formulary Medication (Dextroamphetamine/Amphetamine [Adderall 20 Mg Tablet])

 1 tab PO TID HERNANDEZ


Ondansetron HCl (Ondansetron 4 Mg/2 Ml Sdv)  4 mg IV Q6H PRN


   PRN Reason: Nausea/Vomiting


   Last Admin: 06/25/21 15:43  Dose: 4 mg


   Documented by: MUITDAM


Oxycodone HCl (Oxycodone 5 Mg Tab)  10 mg PO Q4H PRN


   PRN Reason: Pain (moderate 4-6)


Phenazopyridine HCl (Phenazopyridine 95 Mg Tab)  95 mg PO TIDPC HERNANDEZ


Polyethylene Glycol (Polyethylene Glycol 3350 Powder 17 Gm Packet)  17 gm PO BID

PRN


   PRN Reason: Constipation


   Last Admin: 06/25/21 15:00  Dose: 17 gm


   Documented by: JESS


Senna/Docusate Sodium (Docusate Sodium/Sennosides 50-8.6 Mg Tab)  1 tab PO BID 

HERNANDEZ


   Last Admin: 06/25/21 16:00 Dose:  Not Given


   Documented by: JESS








Assessment/Plan Comment:: 





I have seen and examined the patient independently of Dung Luna PA-C, and 

have reviewed the case with him.  I have reviewed and agree with the plan and 

care as outlined by him.  Please see orders.

## 2021-06-25 NOTE — EDM.PDOC
ED HPI GENERAL MEDICAL PROBLEM





- General


Chief Complaint: Abdominal Pain


Stated Complaint: UTI SX ARE GETTING WORSE


Time Seen by Provider: 06/25/21 10:45





- History of Present Illness


INITIAL COMMENTS - FREE TEXT/NARRATIVE: 





46-year-old female presents the emergency room with worsening dysuria nausea and

pain.





Patient was seen here 2 days ago diagnosed with urinary tract infection.  She 

was started on Omnicef 300 mg twice daily and she has been taking this 

faithfully but is continuing to get worse.  The patient does not have a history 

of kidney stones however as of strong family history of kidney stones.  Her pain

is only on the left side.  But she has quite a bit of dysuria when she has to 

void.  She is not aware of any fevers or chills but generally just does not feel

good.


  ** Abdominal


Pain Score (Numeric/FACES): 8





- Related Data


                                    Allergies











Allergy/AdvReac Type Severity Reaction Status Date / Time


 


amoxicillin Allergy Severe Anaphylactic Verified 06/25/21 09:35





   Shock  











Home Meds: 


                                    Home Meds





Cefdinir [Omnicef] 300 mg PO BID 7 Days #14 cap 06/23/21 [Rx]


Dextroamphetamine/Amphetamine [Adderall 20 mg Tablet] 1 tab PO TID 06/23/21 

[History]


Fluconazole [Diflucan] 150 mg PO ASDIRECTED #1 tablet 06/23/21 [Rx]


LORazepam [Ativan] 0.5 mg PO BID PRN 06/23/21 [History]











Past Medical History


Genitourinary History: Reports: UTI, Recurrent


Psychiatric History: Reports: ADHD, Anxiety





- Past Surgical History


GI Surgical History: Reports: Appendectomy


Female  Surgical History: Reports: Hysterectomy


Musculoskeletal Surgical History: Reports: Carpal Tunnel, Other (See Below)


Other Musculoskeletal Surgeries/Procedures:: Foot Surgery





Social & Family History





- Tobacco Use


Tobacco Use Status *Q: Never Tobacco User


Second Hand Smoke Exposure: No





- Caffeine Use


Caffeine Use: Reports: Soda





- Alcohol Use


Days Per Week of Alcohol Use: 7


Number of Drinks Per Day: 1


Total Drinks Per Week: 7





- Recreational Drug Use


Recreational Drug Use: Yes





ED ROS GENERAL





- Review of Systems


Review Of Systems: See Below


Constitutional: Denies: Fever, Chills


Respiratory: Reports: No Symptoms


Cardiovascular: Reports: No Symptoms


GI/Abdominal: Reports: Abdominal Pain (All left-sided), Nausea.  Denies: 

Constipation, Diarrhea


: Reports: Dysuria, Flank Pain, Frequency, Other (She has had a hysterectomy i

n the past)


Neurological: Reports: No Symptoms





ED EXAM, GENERAL





- Physical Exam


Exam: See Below


Exam Limited By: No Limitations


General Appearance: Alert, Moderate Distress (From discomfort)


Head: Atraumatic, Normocephalic


Neck: Normal Inspection, Supple, Non-Tender, Full Range of Motion.  No: 

Lymphadenopathy (L), Lymphadenopathy (R)


Respiratory/Chest: No Respiratory Distress, Lungs Clear, Normal Breath Sounds


Cardiovascular: Regular Rate, Rhythm, No Edema, No Murmur


GI/Abdominal: Normal Bowel Sounds, Soft, Other (He has left-sided discomfort not

 necessarily worsened with palpation she has 1 small area that is slightly 

worsened with palpation.  This is all on the left side no right-sided 

discomfort.).  No: Guarding, Rigid, Rebound


Back Exam: Normal Inspection.  No: CVA Tenderness (L), CVA Tenderness (R)


Extremities: Normal Inspection, No Pedal Edema


Neurological: Alert, Oriented, Normal Cognition





Course





- Vital Signs


Last Recorded V/S: 


                                Last Vital Signs











Temp  36.9 C   06/25/21 09:37


 


Pulse  77   06/25/21 09:37


 


Resp  19   06/25/21 09:37


 


BP  140/89   06/25/21 09:37


 


Pulse Ox  99   06/25/21 09:37














- Orders/Labs/Meds


Orders: 


                               Active Orders 24 hr











 Category Date Time Status


 


 CULTURE URINE [MREF] Stat Lab  06/25/21 13:04 Ordered


 


 Levofloxacin/Dextrose 5%-Water [Levaquin in D5W 500 MG/ Med  06/25/21 13:02 

Ordered





 100 ML] 500 mg   





 Premix Bag 1 bag   





 IV ONETIME   


 


 Sodium Chloride 0.9% [Normal Saline] 1,000 ml Med  06/25/21 10:30 Active





 IV ASDIRECTED   








                                Medication Orders





Sodium Chloride (Normal Saline)  1,000 mls @ 150 mls/hr IV ASDIRECTED HERNANDEZ


   Last Admin: 06/25/21 10:31  Dose: 150 mls/hr


   Documented by: BRISA


Levofloxacin/Dextrose 500 mg/ (Premix)  100 mls @ 100 mls/hr IV ONETIME ONE


   Stop: 06/25/21 14:01








Labs: 


                                Laboratory Tests











  06/25/21 06/25/21 06/25/21 Range/Units





  09:40 09:50 09:50 


 


WBC   9.27   (3.98-10.04)  K/mm3


 


RBC   3.96 L   (3.98-5.22)  M/mm3


 


Hgb   12.4   (11.2-15.7)  gm/dl


 


Hct   37.5   (34.1-44.9)  %


 


MCV   94.7   (79.4-94.8)  fl


 


MCH   31.3   (25.6-32.2)  pg


 


MCHC   33.1   (32.2-35.5)  g/dl


 


RDW Std Deviation   44.1   (36.4-46.3)  fL


 


Plt Count   238   (182-369)  K/mm3


 


MPV   10.8   (9.4-12.3)  fl


 


Neutrophils % (Manual)   70 H   (40-60)  %


 


Band Neutrophils %   2   (0-10)  %


 


Lymphocytes % (Manual)   17 L   (20-40)  %


 


Atypical Lymphs %   0   %


 


Monocytes % (Manual)   8   (2-10)  %


 


Eosinophils % (Manual)   2   (0.7-5.8)  %


 


Basophils % (Manual)   1   (0.1-1.2)  


 


Platelet Estimate   Adequate   


 


Plt Morphology Comment   Normal   


 


RBC Morph Comment   Normal   


 


Sodium    141  (136-145)  mEq/L


 


Potassium    3.8  (3.5-5.1)  mEq/L


 


Chloride    106  ()  mEq/L


 


Carbon Dioxide    26  (21-32)  mEq/L


 


Anion Gap    12.8  (5-15)  


 


BUN    9  (7-18)  mg/dL


 


Creatinine    0.8  (0.55-1.02)  mg/dL


 


Est Cr Clr Drug Dosing    81.80  mL/min


 


Estimated GFR (MDRD)    > 60  (>60)  mL/min


 


BUN/Creatinine Ratio    11.3 L  (14-18)  


 


Glucose    103 H  (70-99)  mg/dL


 


Calcium    8.1 L  (8.5-10.1)  mg/dL


 


Total Bilirubin    0.4  (0.2-1.0)  mg/dL


 


AST    38 H  (15-37)  U/L


 


ALT    28  (14-59)  U/L


 


Alkaline Phosphatase    78  ()  U/L


 


C-Reactive Protein    0.5  (<1.0)  mg/dL


 


Total Protein    6.8  (6.4-8.2)  g/dl


 


Albumin    3.6  (3.4-5.0)  g/dl


 


Globulin    3.2  gm/dL


 


Albumin/Globulin Ratio    1.1  (1-2)  


 


Lipase    86  ()  U/L


 


Urine Color  Dark yellow    (Yellow)  


 


Urine Appearance  Slt cloudy H    (Clear)  


 


Urine pH  5.5    (5.0-8.0)  


 


Ur Specific Gravity  > or = 1.030    (1.005-1.030)  


 


Urine Protein  1+ H    (Negative)  


 


Urine Glucose (UA)  Negative    (Negative)  


 


Urine Ketones  Negative    (Negative)  


 


Urine Occult Blood  Negative    (Negative)  


 


Urine Nitrite  Negative    (Negative)  


 


Urine Bilirubin  1+ H    (Negative)  


 


Urine Urobilinogen  0.2    (0.2-1.0)  


 


Ur Leukocyte Esterase  Negative    (Negative)  


 


Urine RBC  0-5    (0-5)  /hpf


 


Urine WBC  20-30 H    (0-5)  /hpf


 


Ur Epithelial Cells  5-10 H    (0-5)  /hpf


 


Ur Renal Epithelial Cell  0-5    (0-5)  /hpf


 


Calcium Oxalate Crystal  Moderate H    (NONE)  


 


Urine Bacteria  Moderate H    (FEW)  /hpf


 


Urine Mucus  Many H    (FEW)  /hpf











Meds: 


Medications











Generic Name Dose Route Start Last Admin





  Trade Name Francine  PRN Reason Stop Dose Admin


 


Sodium Chloride  1,000 mls @ 150 mls/hr  06/25/21 10:30  06/25/21 10:31





  Normal Saline  IV   150 mls/hr





  ASDIRECTED HERNANDEZ   Administration


 


Levofloxacin/Dextrose 500 mg/  100 mls @ 100 mls/hr  06/25/21 13:02 





  Premix  IV  06/25/21 14:01 





  ONETIME ONE  














Discontinued Medications














Generic Name Dose Route Start Last Admin





  Trade Name Carlosq  PRN Reason Stop Dose Admin


 


Hydromorphone HCl  0.5 mg  06/25/21 10:54  06/25/21 10:58





  Hydromorphone 0.5 Mg/0.5 Ml Syringe  IVPUSH  06/25/21 10:55  0.5 mg





  ONETIME ONE   Administration


 


Hydromorphone HCl  0.5 mg  06/25/21 12:29  06/25/21 12:36





  Hydromorphone 0.5 Mg/0.5 Ml Syringe  IVPUSH  06/25/21 12:30  0.5 mg





  ONETIME ONE   Administration


 


Ondansetron HCl  4 mg  06/25/21 10:25  06/25/21 10:31





  Ondansetron 4 Mg/2 Ml Sdv  IVPUSH  06/25/21 10:26  4 mg





  ONETIME ONE   Administration














- Re-Assessments/Exams


Free Text/Narrative Re-Assessment/Exam: 





06/25/21 12:33


Discussed the patient's case with microbiology see if there is anything back on 

the urine culture and there is no reports back yet.


06/25/21 13:12


Patient should have had a more significant response on the Omnicef 300 mg twice 

daily if she was can have a good clinical response.  Patient has failed 

outpatient treatment.  CT shows a small nonobstructing stone within the mid 

right kidney no evidence to ureteral dilation or ureteral stone seen she has 

prominent stool throughout the colon.





Case discussed with Dr. Marc, our hospitalist who will assume care.  At this 

point I have ordered another urine culture on today's UA and will give the 

patient Levaquin 500 mg.





Departure





- Departure


Time of Disposition: 13:13


Disposition: Refer to Observation


Clinical Impression: 


 Pyelonephritis of left kidney








- Discharge Information


Referrals: 


PCP,Not In Area [Primary Care Provider] - 


Forms:  ED Department Discharge





Sepsis Event Note (ED)





- Evaluation


Sepsis Screening Result: No Definite Risk





- Focused Exam


Vital Signs: 


                                   Vital Signs











  Temp Pulse Resp BP Pulse Ox


 


 06/25/21 09:37  36.9 C  77  19  140/89  99














- My Orders


Last 24 Hours: 


My Active Orders





06/25/21 10:30


Sodium Chloride 0.9% [Normal Saline] 1,000 ml IV ASDIRECTED 





06/25/21 13:02


Levofloxacin/Dextrose 5%-Water [Levaquin in D5W 500 MG/100 ML] 500 mg   Premix 

Bag 1 bag IV ONETIME 





06/25/21 13:04


CULTURE URINE [MREF] Stat 














- Assessment/Plan


Last 24 Hours: 


My Active Orders





06/25/21 10:30


Sodium Chloride 0.9% [Normal Saline] 1,000 ml IV ASDIRECTED 





06/25/21 13:02


Levofloxacin/Dextrose 5%-Water [Levaquin in D5W 500 MG/100 ML] 500 mg   Premix 

Bag 1 bag IV ONETIME 





06/25/21 13:04


CULTURE URINE [MREF] Stat

## 2021-06-26 RX ADMIN — LEVOFLOXACIN SCH MLS/HR: 500 INJECTION, SOLUTION INTRAVENOUS at 12:02

## 2021-06-26 RX ADMIN — Medication SCH MG: at 08:11

## 2021-06-26 RX ADMIN — Medication SCH MG: at 12:02

## 2021-06-26 RX ADMIN — SODIUM CHLORIDE PRN MG: 9 INJECTION, SOLUTION INTRAVENOUS at 07:50

## 2021-06-26 RX ADMIN — SODIUM CHLORIDE PRN MG: 9 INJECTION, SOLUTION INTRAVENOUS at 17:36

## 2021-06-26 RX ADMIN — Medication SCH MG: at 18:23

## 2021-06-26 NOTE — PCM.PN
- General Info


Date of Service: 06/26/21


Admission Dx/Problem (Free Text): 


                           Admission Diagnosis/Problem





Admission Diagnosis/Problem      UTI (urinary tract infection), uncomplicated








Subjective Update: 





The patient is a 46-year-old lady who was admitted to acute hospitalization 

yesterday secondary to urinary tract infection with failed outpatient treatment.

 The patient had a CT scan which showed a small nonobstructing stone within the 

mid right kidney.  No ureteral dilation.  The patient says today that her pain 

is well controlled.  She feels better today.  The patient says that she feels 

like she could stay another day.  She also has had some vomiting associated with

diet.  No fever or chills.


Functional Status: Reports: Pain Controlled.  Denies: Tolerating Diet





- Review of Systems


General: Reports: Weakness


HEENT: Reports: No Symptoms


Pulmonary: Reports: No Symptoms


Cardiovascular: Reports: No Symptoms


Gastrointestinal: Reports: Constipation, Vomiting


Genitourinary: Reports: Flank Pain


Musculoskeletal: Reports: No Symptoms


Skin: Reports: No Symptoms


Neurological: Reports: No Symptoms


Psychiatric: Reports: No Symptoms





- Patient Data


Vitals - Most Recent: 


                                Last Vital Signs











Temp  36.6 C   06/26/21 04:51


 


Pulse  59 L  06/26/21 04:51


 


Resp  16   06/26/21 04:51


 


BP  121/56 L  06/26/21 04:51


 


Pulse Ox  96   06/26/21 04:51











Weight - Most Recent: 62.46 kg


I&O - Last 24 Hours: 


                                 Intake & Output











 06/25/21 06/26/21 06/26/21





 22:59 06:59 14:59


 


Intake Total 353 2614 


 


Output Total 1600 650 


 


Balance -1247 1964 











Lab Results Last 24 Hours: 


                         Laboratory Results - last 24 hr











  06/25/21 06/25/21 06/25/21 Range/Units





  09:40 09:50 09:50 


 


WBC   9.27   (3.98-10.04)  K/mm3


 


RBC   3.96 L   (3.98-5.22)  M/mm3


 


Hgb   12.4   (11.2-15.7)  gm/dl


 


Hct   37.5   (34.1-44.9)  %


 


MCV   94.7   (79.4-94.8)  fl


 


MCH   31.3   (25.6-32.2)  pg


 


MCHC   33.1   (32.2-35.5)  g/dl


 


RDW Std Deviation   44.1   (36.4-46.3)  fL


 


Plt Count   238   (182-369)  K/mm3


 


MPV   10.8   (9.4-12.3)  fl


 


Neut % (Auto)     (34.0-71.1)  %


 


Lymph % (Auto)     (19.3-51.7)  %


 


Mono % (Auto)     (4.7-12.5)  %


 


Eos % (Auto)     (0.7-5.8)  


 


Baso % (Auto)     (0.1-1.2)  %


 


Neut # (Auto)     (1.56-6.13)  K/mm3


 


Lymph # (Auto)     (1.18-3.74)  K/mm3


 


Mono # (Auto)     (0.24-0.36)  K/mm3


 


Eos # (Auto)     (0.04-0.36)  K/mm3


 


Baso # (Auto)     (0.01-0.08)  K/mm3


 


Neutrophils % (Manual)   70 H   (40-60)  %


 


Band Neutrophils %   2   (0-10)  %


 


Lymphocytes % (Manual)   17 L   (20-40)  %


 


Atypical Lymphs %   0   %


 


Monocytes % (Manual)   8   (2-10)  %


 


Eosinophils % (Manual)   2   (0.7-5.8)  %


 


Basophils % (Manual)   1   (0.1-1.2)  


 


Platelet Estimate   Adequate   


 


Plt Morphology Comment   Normal   


 


RBC Morph Comment   Normal   


 


Sodium    141  (136-145)  mEq/L


 


Potassium    3.8  (3.5-5.1)  mEq/L


 


Chloride    106  ()  mEq/L


 


Carbon Dioxide    26  (21-32)  mEq/L


 


Anion Gap    12.8  (5-15)  


 


BUN    9  (7-18)  mg/dL


 


Creatinine    0.8  (0.55-1.02)  mg/dL


 


Est Cr Clr Drug Dosing    81.80  mL/min


 


Estimated GFR (MDRD)    > 60  (>60)  mL/min


 


BUN/Creatinine Ratio    11.3 L  (14-18)  


 


Glucose    103 H  (70-99)  mg/dL


 


Calcium    8.1 L  (8.5-10.1)  mg/dL


 


Magnesium     (1.8-2.4)  mg/dL


 


Total Bilirubin    0.4  (0.2-1.0)  mg/dL


 


AST    38 H  (15-37)  U/L


 


ALT    28  (14-59)  U/L


 


Alkaline Phosphatase    78  ()  U/L


 


C-Reactive Protein    0.5  (<1.0)  mg/dL


 


Total Protein    6.8  (6.4-8.2)  g/dl


 


Albumin    3.6  (3.4-5.0)  g/dl


 


Globulin    3.2  gm/dL


 


Albumin/Globulin Ratio    1.1  (1-2)  


 


Lipase    86  ()  U/L


 


Urine Color  Dark yellow    (Yellow)  


 


Urine Appearance  Slt cloudy H    (Clear)  


 


Urine pH  5.5    (5.0-8.0)  


 


Ur Specific Gravity  > or = 1.030    (1.005-1.030)  


 


Urine Protein  1+ H    (Negative)  


 


Urine Glucose (UA)  Negative    (Negative)  


 


Urine Ketones  Negative    (Negative)  


 


Urine Occult Blood  Negative    (Negative)  


 


Urine Nitrite  Negative    (Negative)  


 


Urine Bilirubin  1+ H    (Negative)  


 


Urine Urobilinogen  0.2    (0.2-1.0)  


 


Ur Leukocyte Esterase  Negative    (Negative)  


 


Urine RBC  0-5    (0-5)  /hpf


 


Urine WBC  20-30 H    (0-5)  /hpf


 


Ur Epithelial Cells  5-10 H    (0-5)  /hpf


 


Ur Renal Epithelial Cell  0-5    (0-5)  /hpf


 


Calcium Oxalate Crystal  Moderate H    (NONE)  


 


Urine Bacteria  Moderate H    (FEW)  /hpf


 


Urine Mucus  Many H    (FEW)  /hpf


 


SARS-CoV-2 RNA (TONIA)     (NEGATIVE)  














  06/25/21 06/26/21 06/26/21 Range/Units





  13:19 05:23 05:23 


 


WBC   7.82   (3.98-10.04)  K/mm3


 


RBC   3.37 L   (3.98-5.22)  M/mm3


 


Hgb   10.6 L D   (11.2-15.7)  gm/dl


 


Hct   32.4 L   (34.1-44.9)  %


 


MCV   96.1 H   (79.4-94.8)  fl


 


MCH   31.5   (25.6-32.2)  pg


 


MCHC   32.7   (32.2-35.5)  g/dl


 


RDW Std Deviation   44.8   (36.4-46.3)  fL


 


Plt Count   174 L   (182-369)  K/mm3


 


MPV   11.2   (9.4-12.3)  fl


 


Neut % (Auto)   69.8   (34.0-71.1)  %


 


Lymph % (Auto)   18.4 L   (19.3-51.7)  %


 


Mono % (Auto)   8.8   (4.7-12.5)  %


 


Eos % (Auto)   2.9   (0.7-5.8)  


 


Baso % (Auto)   0.1   (0.1-1.2)  %


 


Neut # (Auto)   5.45   (1.56-6.13)  K/mm3


 


Lymph # (Auto)   1.44   (1.18-3.74)  K/mm3


 


Mono # (Auto)   0.69 H   (0.24-0.36)  K/mm3


 


Eos # (Auto)   0.23   (0.04-0.36)  K/mm3


 


Baso # (Auto)   0.01   (0.01-0.08)  K/mm3


 


Neutrophils % (Manual)     (40-60)  %


 


Band Neutrophils %     (0-10)  %


 


Lymphocytes % (Manual)     (20-40)  %


 


Atypical Lymphs %     %


 


Monocytes % (Manual)     (2-10)  %


 


Eosinophils % (Manual)     (0.7-5.8)  %


 


Basophils % (Manual)     (0.1-1.2)  


 


Platelet Estimate     


 


Plt Morphology Comment     


 


RBC Morph Comment     


 


Sodium    143  (136-145)  mEq/L


 


Potassium    3.8  (3.5-5.1)  mEq/L


 


Chloride    109 H  ()  mEq/L


 


Carbon Dioxide    25  (21-32)  mEq/L


 


Anion Gap    12.8  (5-15)  


 


BUN    8  (7-18)  mg/dL


 


Creatinine    0.6  (0.55-1.02)  mg/dL


 


Est Cr Clr Drug Dosing    109.68  mL/min


 


Estimated GFR (MDRD)    > 60  (>60)  mL/min


 


BUN/Creatinine Ratio    13.3 L  (14-18)  


 


Glucose    90  (70-99)  mg/dL


 


Calcium    7.6 L  (8.5-10.1)  mg/dL


 


Magnesium    1.8  (1.8-2.4)  mg/dL


 


Total Bilirubin     (0.2-1.0)  mg/dL


 


AST     (15-37)  U/L


 


ALT     (14-59)  U/L


 


Alkaline Phosphatase     ()  U/L


 


C-Reactive Protein    2.3 H*  (<1.0)  mg/dL


 


Total Protein     (6.4-8.2)  g/dl


 


Albumin     (3.4-5.0)  g/dl


 


Globulin     gm/dL


 


Albumin/Globulin Ratio     (1-2)  


 


Lipase     ()  U/L


 


Urine Color     (Yellow)  


 


Urine Appearance     (Clear)  


 


Urine pH     (5.0-8.0)  


 


Ur Specific Gravity     (1.005-1.030)  


 


Urine Protein     (Negative)  


 


Urine Glucose (UA)     (Negative)  


 


Urine Ketones     (Negative)  


 


Urine Occult Blood     (Negative)  


 


Urine Nitrite     (Negative)  


 


Urine Bilirubin     (Negative)  


 


Urine Urobilinogen     (0.2-1.0)  


 


Ur Leukocyte Esterase     (Negative)  


 


Urine RBC     (0-5)  /hpf


 


Urine WBC     (0-5)  /hpf


 


Ur Epithelial Cells     (0-5)  /hpf


 


Ur Renal Epithelial Cell     (0-5)  /hpf


 


Calcium Oxalate Crystal     (NONE)  


 


Urine Bacteria     (FEW)  /hpf


 


Urine Mucus     (FEW)  /hpf


 


SARS-CoV-2 RNA (TONIA)  Negative    (NEGATIVE)  











Med Orders - Current: 


                               Current Medications





Acetaminophen (Acetaminophen 325 Mg Tab)  650 mg PO Q4H PRN


   PRN Reason: Pain (Mild 1-3)/fever


Fluconazole (Fluconazole 150 Mg Tab)  150 mg PO ASDIRECTED HERNANDEZ


Hydromorphone HCl (Hydromorphone 0.5 Mg/0.5 Ml Syringe)  0.5 mg IVPUSH Q2H PRN


   PRN Reason: Pain (severe 7-10)


   Last Admin: 06/26/21 06:42 Dose:  0.5 mg


   Documented by: 


Levofloxacin/Dextrose 500 mg/ (Premix)  100 mls @ 100 mls/hr IV Q24H [facility]


Lorazepam (Lorazepam 0.5 Mg Tab)  0.5 mg PO BID PRN


   PRN Reason: Anxiety


Non-Formulary Medication (Dextroamphetamine/Amphetamine [Adderall 20 Mg Tablet])

 1 tab PO TID HERNANDEZ


Ondansetron HCl (Ondansetron 4 Mg/2 Ml Sdv)  4 mg IV Q6H PRN


   PRN Reason: Nausea/Vomiting


   Last Admin: 06/26/21 07:50 Dose:  4 mg


   Documented by: 


Oxycodone HCl (Oxycodone 5 Mg Tab)  10 mg PO Q4H PRN


   PRN Reason: Pain (moderate 4-6)


   Last Admin: 06/26/21 04:48 Dose:  10 mg


   Documented by: 


Phenazopyridine HCl (Phenazopyridine 95 Mg Tab)  95 mg PO TIDPC [facility]


   Last Admin: 06/25/21 18:32 Dose:  95 mg


   Documented by: 


Polyethylene Glycol (Polyethylene Glycol 3350 Powder 17 Gm Packet)  17 gm PO BID

PRN


   PRN Reason: Constipation


   Last Admin: 06/25/21 15:00 Dose:  17 gm


   Documented by: 


Senna/Docusate Sodium (Docusate Sodium/Sennosides 50-8.6 Mg Tab)  1 tab PO BID 

[facility]


   Last Admin: 06/25/21 20:29 Dose:  1 tab


   Documented by: 





Discontinued Medications





Bisacodyl (Bisacodyl 10 Mg Supp)  10 mg RECTAL ONETIME ONE


   Stop: 06/25/21 16:16


   Last Admin: 06/25/21 16:09 Dose:  10 mg


   Documented by: 


Hydromorphone HCl (Hydromorphone 0.5 Mg/0.5 Ml Syringe)  0.5 mg IVPUSH ONETIME 

ONE


   Stop: 06/25/21 10:55


   Last Admin: 06/25/21 10:58 Dose:  0.5 mg


   Documented by: 


Hydromorphone HCl (Hydromorphone 0.5 Mg/0.5 Ml Syringe)  0.5 mg IVPUSH ONETIME 

ONE


   Stop: 06/25/21 12:30


   Last Admin: 06/25/21 12:36 Dose:  0.5 mg


   Documented by: 


Hydromorphone HCl (Hydromorphone 1 Mg/Ml Syringe)  1 mg IVPUSH ONETIME ONE


   Stop: 06/25/21 16:16


   Last Admin: 06/25/21 16:09 Dose:  1 mg


   Documented by: 


Sodium Chloride (Normal Saline)  1,000 mls @ 150 mls/hr IV ASDIRECTED [facility]


   Last Admin: 06/26/21 06:39 Dose:  150 mls/hr


   Documented by: 


Levofloxacin/Dextrose 500 mg/ (Premix)  100 mls @ 100 mls/hr IV ONETIME ONE


   Stop: 06/25/21 14:01


   Last Admin: 06/25/21 13:17 Dose:  100 mls/hr


   Documented by: 


Ondansetron HCl (Ondansetron 4 Mg/2 Ml Sdv)  4 mg IVPUSH ONETIME ONE


   Stop: 06/25/21 10:26


   Last Admin: 06/25/21 10:31 Dose:  4 mg


   Documented by: 











- Exam


Quality Assessment: DVT Prophylaxis.  No: Supplemental Oxygen


General: Alert, Oriented, Cooperative, No Acute Distress


HEENT: Pupils Equal, Pupils Reactive, EOMI, Mucous Membr. Moist/Pink


Neck: Supple, Trachea Midline


Lungs: Clear to Auscultation, Normal Respiratory Effort


Cardiovascular: Regular Rate, Regular Rhythm


GI/Abdominal Exam: Normal Bowel Sounds, Soft, Non-Tender, No Distention


 (Female) Exam: Deferred


Back Exam: Normal Inspection, Full Range of Motion


Extremities: Normal Inspection, No Pedal Edema


Skin: Warm, Dry, Intact


Neurological: No New Focal Deficit


Psy/Mental Status: Alert, Normal Affect, Normal Mood





- Patient Data


Lab Results Last 24 hrs: 


                         Laboratory Results - last 24 hr











  06/25/21 06/25/21 06/25/21 Range/Units





  09:40 09:50 09:50 


 


WBC   9.27   (3.98-10.04)  K/mm3


 


RBC   3.96 L   (3.98-5.22)  M/mm3


 


Hgb   12.4   (11.2-15.7)  gm/dl


 


Hct   37.5   (34.1-44.9)  %


 


MCV   94.7   (79.4-94.8)  fl


 


MCH   31.3   (25.6-32.2)  pg


 


MCHC   33.1   (32.2-35.5)  g/dl


 


RDW Std Deviation   44.1   (36.4-46.3)  fL


 


Plt Count   238   (182-369)  K/mm3


 


MPV   10.8   (9.4-12.3)  fl


 


Neut % (Auto)     (34.0-71.1)  %


 


Lymph % (Auto)     (19.3-51.7)  %


 


Mono % (Auto)     (4.7-12.5)  %


 


Eos % (Auto)     (0.7-5.8)  


 


Baso % (Auto)     (0.1-1.2)  %


 


Neut # (Auto)     (1.56-6.13)  K/mm3


 


Lymph # (Auto)     (1.18-3.74)  K/mm3


 


Mono # (Auto)     (0.24-0.36)  K/mm3


 


Eos # (Auto)     (0.04-0.36)  K/mm3


 


Baso # (Auto)     (0.01-0.08)  K/mm3


 


Neutrophils % (Manual)   70 H   (40-60)  %


 


Band Neutrophils %   2   (0-10)  %


 


Lymphocytes % (Manual)   17 L   (20-40)  %


 


Atypical Lymphs %   0   %


 


Monocytes % (Manual)   8   (2-10)  %


 


Eosinophils % (Manual)   2   (0.7-5.8)  %


 


Basophils % (Manual)   1   (0.1-1.2)  


 


Platelet Estimate   Adequate   


 


Plt Morphology Comment   Normal   


 


RBC Morph Comment   Normal   


 


Sodium    141  (136-145)  mEq/L


 


Potassium    3.8  (3.5-5.1)  mEq/L


 


Chloride    106  ()  mEq/L


 


Carbon Dioxide    26  (21-32)  mEq/L


 


Anion Gap    12.8  (5-15)  


 


BUN    9  (7-18)  mg/dL


 


Creatinine    0.8  (0.55-1.02)  mg/dL


 


Est Cr Clr Drug Dosing    81.80  mL/min


 


Estimated GFR (MDRD)    > 60  (>60)  mL/min


 


BUN/Creatinine Ratio    11.3 L  (14-18)  


 


Glucose    103 H  (70-99)  mg/dL


 


Calcium    8.1 L  (8.5-10.1)  mg/dL


 


Magnesium     (1.8-2.4)  mg/dL


 


Total Bilirubin    0.4  (0.2-1.0)  mg/dL


 


AST    38 H  (15-37)  U/L


 


ALT    28  (14-59)  U/L


 


Alkaline Phosphatase    78  ()  U/L


 


C-Reactive Protein    0.5  (<1.0)  mg/dL


 


Total Protein    6.8  (6.4-8.2)  g/dl


 


Albumin    3.6  (3.4-5.0)  g/dl


 


Globulin    3.2  gm/dL


 


Albumin/Globulin Ratio    1.1  (1-2)  


 


Lipase    86  ()  U/L


 


Urine Color  Dark yellow    (Yellow)  


 


Urine Appearance  Slt cloudy H    (Clear)  


 


Urine pH  5.5    (5.0-8.0)  


 


Ur Specific Gravity  > or = 1.030    (1.005-1.030)  


 


Urine Protein  1+ H    (Negative)  


 


Urine Glucose (UA)  Negative    (Negative)  


 


Urine Ketones  Negative    (Negative)  


 


Urine Occult Blood  Negative    (Negative)  


 


Urine Nitrite  Negative    (Negative)  


 


Urine Bilirubin  1+ H    (Negative)  


 


Urine Urobilinogen  0.2    (0.2-1.0)  


 


Ur Leukocyte Esterase  Negative    (Negative)  


 


Urine RBC  0-5    (0-5)  /hpf


 


Urine WBC  20-30 H    (0-5)  /hpf


 


Ur Epithelial Cells  5-10 H    (0-5)  /hpf


 


Ur Renal Epithelial Cell  0-5    (0-5)  /hpf


 


Calcium Oxalate Crystal  Moderate H    (NONE)  


 


Urine Bacteria  Moderate H    (FEW)  /hpf


 


Urine Mucus  Many H    (FEW)  /hpf


 


SARS-CoV-2 RNA (TONIA)     (NEGATIVE)  














  06/25/21 06/26/21 06/26/21 Range/Units





  13:19 05:23 05:23 


 


WBC   7.82   (3.98-10.04)  K/mm3


 


RBC   3.37 L   (3.98-5.22)  M/mm3


 


Hgb   10.6 L D   (11.2-15.7)  gm/dl


 


Hct   32.4 L   (34.1-44.9)  %


 


MCV   96.1 H   (79.4-94.8)  fl


 


MCH   31.5   (25.6-32.2)  pg


 


MCHC   32.7   (32.2-35.5)  g/dl


 


RDW Std Deviation   44.8   (36.4-46.3)  fL


 


Plt Count   174 L   (182-369)  K/mm3


 


MPV   11.2   (9.4-12.3)  fl


 


Neut % (Auto)   69.8   (34.0-71.1)  %


 


Lymph % (Auto)   18.4 L   (19.3-51.7)  %


 


Mono % (Auto)   8.8   (4.7-12.5)  %


 


Eos % (Auto)   2.9   (0.7-5.8)  


 


Baso % (Auto)   0.1   (0.1-1.2)  %


 


Neut # (Auto)   5.45   (1.56-6.13)  K/mm3


 


Lymph # (Auto)   1.44   (1.18-3.74)  K/mm3


 


Mono # (Auto)   0.69 H   (0.24-0.36)  K/mm3


 


Eos # (Auto)   0.23   (0.04-0.36)  K/mm3


 


Baso # (Auto)   0.01   (0.01-0.08)  K/mm3


 


Neutrophils % (Manual)     (40-60)  %


 


Band Neutrophils %     (0-10)  %


 


Lymphocytes % (Manual)     (20-40)  %


 


Atypical Lymphs %     %


 


Monocytes % (Manual)     (2-10)  %


 


Eosinophils % (Manual)     (0.7-5.8)  %


 


Basophils % (Manual)     (0.1-1.2)  


 


Platelet Estimate     


 


Plt Morphology Comment     


 


RBC Morph Comment     


 


Sodium    143  (136-145)  mEq/L


 


Potassium    3.8  (3.5-5.1)  mEq/L


 


Chloride    109 H  ()  mEq/L


 


Carbon Dioxide    25  (21-32)  mEq/L


 


Anion Gap    12.8  (5-15)  


 


BUN    8  (7-18)  mg/dL


 


Creatinine    0.6  (0.55-1.02)  mg/dL


 


Est Cr Clr Drug Dosing    109.68  mL/min


 


Estimated GFR (MDRD)    > 60  (>60)  mL/min


 


BUN/Creatinine Ratio    13.3 L  (14-18)  


 


Glucose    90  (70-99)  mg/dL


 


Calcium    7.6 L  (8.5-10.1)  mg/dL


 


Magnesium    1.8  (1.8-2.4)  mg/dL


 


Total Bilirubin     (0.2-1.0)  mg/dL


 


AST     (15-37)  U/L


 


ALT     (14-59)  U/L


 


Alkaline Phosphatase     ()  U/L


 


C-Reactive Protein    2.3 H*  (<1.0)  mg/dL


 


Total Protein     (6.4-8.2)  g/dl


 


Albumin     (3.4-5.0)  g/dl


 


Globulin     gm/dL


 


Albumin/Globulin Ratio     (1-2)  


 


Lipase     ()  U/L


 


Urine Color     (Yellow)  


 


Urine Appearance     (Clear)  


 


Urine pH     (5.0-8.0)  


 


Ur Specific Gravity     (1.005-1.030)  


 


Urine Protein     (Negative)  


 


Urine Glucose (UA)     (Negative)  


 


Urine Ketones     (Negative)  


 


Urine Occult Blood     (Negative)  


 


Urine Nitrite     (Negative)  


 


Urine Bilirubin     (Negative)  


 


Urine Urobilinogen     (0.2-1.0)  


 


Ur Leukocyte Esterase     (Negative)  


 


Urine RBC     (0-5)  /hpf


 


Urine WBC     (0-5)  /hpf


 


Ur Epithelial Cells     (0-5)  /hpf


 


Ur Renal Epithelial Cell     (0-5)  /hpf


 


Calcium Oxalate Crystal     (NONE)  


 


Urine Bacteria     (FEW)  /hpf


 


Urine Mucus     (FEW)  /hpf


 


SARS-CoV-2 RNA (TONIA)  Negative    (NEGATIVE)  











Result Diagrams: 


                                 06/26/21 05:23





                                 06/26/21 05:23





Sepsis Event Note





- Evaluation


Sepsis Screening Result: No Definite Risk





- Focused Exam


Vital Signs: 


                                   Vital Signs











  Temp Pulse Resp BP Pulse Ox


 


 06/26/21 04:51  36.6 C  59 L  16  121/56 L  96


 


 06/26/21 00:09  36.8 C  73  16  109/60  95


 


 06/25/21 20:29  36.8 C  69  18  112/54 L  96














- Problem List & Annotations


(1) UTI (urinary tract infection)


SNOMED Code(s): 79771531


   Code(s): N39.0 - URINARY TRACT INFECTION, SITE NOT SPECIFIED   Status: Acute 

 Priority: High   Current Visit: Yes   


Qualifiers: 


   Urinary tract infection type: acute cystitis   Hematuria presence: with 

hematuria   Qualified Code(s): N30.01 - Acute cystitis with hematuria   





(2) Pyelonephritis of left kidney


SNOMED Code(s): 00745323


   Code(s): N12 - TUBULO-INTERSTITIAL NEPHRITIS, NOT SPCF AS ACUTE OR CHRONIC   

Status: Acute   Priority: High   Current Visit: Yes   





(3) Constipation


SNOMED Code(s): 95793381


   Code(s): K59.00 - CONSTIPATION, UNSPECIFIED   Status: Chronic   Priority: H

igh   Current Visit: Yes   


Qualifiers: 


   Constipation type: unspecified constipation type   Qualified Code(s): K59.00 

- Constipation, unspecified   





(4) Renal stone


SNOMED Code(s): 50494534


   Code(s): N20.0 - CALCULUS OF KIDNEY   Status: Acute   Priority: Medium   

Current Visit: Yes   Annotation/Comment:: Nonobstructing, located in mid kidney,

incidental finding   





- Problem List Review


Problem List Initiated/Reviewed/Updated: Yes





- My Orders


Last 24 Hours: 


My Active Orders





06/25/21 14:44


polyethylene glycoL 3350 [MiraLAX]   17 gm PO BID PRN 














- Plan


Plan:: 





06/26/2021





The patient is a 46-year-old lady who is doing better today.  She is not able to

 tolerate diet therefore will be retained 1 extra day.  Continue Levaquin.  The 

patient will be transitioned to p.o. ciprofloxacin.  Continue Colace for 

constipation.  Patient has been encouraged to ambulate.  She will be on regular 

diet as tolerated.  The patient should be appropriate for discharge tomorrow.

## 2021-06-27 RX ADMIN — Medication SCH MG: at 08:24

## 2021-06-27 RX ADMIN — LEVOFLOXACIN SCH: 500 INJECTION, SOLUTION INTRAVENOUS at 14:07

## 2021-06-27 RX ADMIN — Medication SCH: at 14:07

## 2021-06-27 NOTE — US
Renal ultrasound: Multiple real-time images of the kidneys were 

obtained.

 

Comparison: Prior CT abdomen and pelvis study of 06/25/21.

 

Findings: Kidneys show no hydronephrosis or mass.  Resistivity indices

 within both kidneys are preserved.  Finding is seen suspicious for a 

small stone within the right kidney.  No cyst or solid abnormality is 

appreciated.

 

Right kidney length: 10.5 cm

Left kidney length: 12.0 cm

 

Prevoid bladder volume is 344 mL and postvoid bladder volume is 5.1 

mL.  Bilateral ureteral jets are seen.  Minimal debris is noted within

 the bladder.

 

Impression:

1.  Minimal bladder debris raising the possibility of UTI.  Please 

correlate.

2.  Possible minimal calcification within the right kidney.

3.  No additional abnormality is appreciated on renal ultrasound exam.

 

Diagnostic code #3

## 2021-06-27 NOTE — [id number]
**Discharge Summary





- Hospital Course


HPI Initial Comments: 





The patient was admitted for IV antibiotic treatment of urinary tract infection 

that had failed outpatient.


Diagnosis: Stroke: No





- Discharge Data


Discharge Date: 06/27/21


Discharge Disposition: Home, Self-Care 01


Condition: Good





- Referral to Home Health


Primary Care Physician: 


PCP Not In Area








- Discharge Diagnosis/Problem(s)


(1) UTI (urinary tract infection)


SNOMED Code(s): 65343637


   ICD Code: N39.0 - URINARY TRACT INFECTION, SITE NOT SPECIFIED   Status: 

Resolved   Priority: High   Current Visit: Yes   


Qualifiers: 


   Urinary tract infection type: acute cystitis   Hematuria presence: with 

hematuria   Qualified Code(s): N30.01 - Acute cystitis with hematuria   





(2) Pyelonephritis of left kidney


SNOMED Code(s): 38465198


   ICD Code: N12 - TUBULO-INTERSTITIAL NEPHRITIS, NOT SPCF AS ACUTE OR CHRONIC  

Status: Resolved   Priority: High   Current Visit: Yes   





(3) Constipation


SNOMED Code(s): 62493731


   ICD Code: K59.00 - CONSTIPATION, UNSPECIFIED   Status: Chronic   Priority: 

High   Current Visit: Yes   


Qualifiers: 


   Constipation type: unspecified constipation type   Qualified Code(s): K59.00 

- Constipation, unspecified   





(4) Renal stone


SNOMED Code(s): 05482215


   ICD Code: N20.0 - CALCULUS OF KIDNEY   Status: Acute   Priority: Medium   

Current Visit: Yes   Problem Details: Nonobstructing, located in mid kidney, 

incidental finding   





- Patient Summary/Data


Hospital Course: 





The patient is a 46-year-old lady who had been admitted observation due to 

failure as an outpatient of antibiotics for urinary tract infection.  The 

patient also had been having significant pain.  The patient had presented to the

emergency department 2 days prior to admission.  She was placed on antibiotics 

Omnicef and did not improve.  Upon admission she was placed in observation 

status and started on Levaquin 500 mg IV daily.  The patient also had rather 

severe pain and she was medicated with narcotic pain medications, Dilaudid 1 mg 

IV every 2 hours and her pain was controlled.  The patient continued to improve 

through the short course of hospitalization and she had tolerated the 

antibiotics.  CT scan of her abdomen obtained upon admission showed that she 

also had severe constipation and she had been treated with MiraLAX as she has 

used this in the past and this is what has helped her.  The CT scan also 

revealed a incidental finding of a tiny stone in the mid kidney pelvis.  

Ultrasound obtained prior to discharge also showed the stone in the same 

position.  The patient also had cultures of her urine taken on prior ER prese

ntation which had grown out pansensitive E. coli.  The patient was discharged on

ciprofloxacin 500 mg p.o. twice daily for 3 days.  Second urine culture 

essentially showed no growth.  The patient has been recommended to continue with

her home medication which consist of Adderall and lorazepam.  The patient had 

been tolerating diet.  She also has been recommended continue with activity as 

tolerated.  The patient also has been recommended to follow-up with her primary 

care physician in [place].  The patient has been hemodynamically stable and 

she is discharged from acute hospitalization with the recommendations listed 

above.





- Patient Instructions


Diet: Usual Diet as Tolerated


Activity: As Tolerated





- Discharge Plan


*PRESCRIPTION DRUG MONITORING PROGRAM REVIEWED*: No


*COPY OF PRESCRIPTION DRUG MONITORING REPORT IN PATIENT DINORA: No


Prescriptions/Med Rec: 


Ciprofloxacin HCl [Cipro] 500 mg PO BID #6 tablet


Phenazopyridine [Urinary Pain Relief] 95 mg PO TIDPC PRN #12 tablet


 PRN Reason: Pain


Home Medications: 


                                    Home Meds





Dextroamphetamine/Amphetamine [Adderall 20 mg Tablet] 1 tab PO TID 06/23/21 

[History]


Fluconazole [Diflucan] 150 mg PO ASDIRECTED #1 tablet 06/23/21 [Rx]


LORazepam [Ativan] 0.5 mg PO BID PRN 06/23/21 [History]


Ciprofloxacin HCl [Cipro] 500 mg PO BID #6 tablet 06/27/21 [Rx]


Phenazopyridine [Urinary Pain Relief] 95 mg PO TIDPC PRN #12 tablet 06/27/21 

[Rx]








Oxygen Therapy Mode: Room Air


Referrals: 


PCP,Not In Area [Primary Care Provider] -  (Please follow-up with your primary 

care provider in 7-10 days.)





- Discharge Summary/Plan Comment


DC Time >30 min.: Yes





- General Info


Date of Service: 06/27/21


Admission Dx/Problem (Free Text: 


                           Admission Diagnosis/Problem





Admission Diagnosis/Problem      UTI (urinary tract infection), uncomplicated, 

failed outpatient treatment








Subjective Update: 





The patient says that she has been tolerating her diet.  She feels better today.

 She also says that she feels like she can go home.


Functional Status: Reports: Pain Controlled, Tolerating Diet





- Review of Systems


General: Reports: No Symptoms


HEENT: Reports: No Symptoms


Pulmonary: Reports: No Symptoms


Cardiovascular: Reports: No Symptoms


Gastrointestinal: Reports: No Symptoms


Genitourinary: Reports: No Symptoms


Musculoskeletal: Reports: No Symptoms


Skin: Reports: No Symptoms


Neurological: Reports: No Symptoms


Psychiatric: Reports: No Symptoms





- Patient Data


Vitals - Most Recent: 


                                Last Vital Signs











Temp  36.8 C   06/27/21 01:22


 


Pulse  80   06/27/21 01:22


 


Resp  18   06/27/21 01:22


 


BP  125/80   06/27/21 01:22


 


Pulse Ox  91 L  06/27/21 01:22











Weight - Most Recent: 62.641 kg


I&O - Last 24 hours: 


                                 Intake & Output











 06/26/21 06/27/21 06/27/21





 22:59 06:59 14:59


 


Intake Total 1760  


 


Output Total 1000 900 


 


Balance 760 -900 











Lab Results - Last 24 hrs: 


                         Laboratory Results - last 24 hr











  06/27/21 06/27/21 Range/Units





  05:03 05:03 


 


WBC  4.47   (3.98-10.04)  K/mm3


 


RBC  3.25 L   (3.98-5.22)  M/mm3


 


Hgb  10.2 L   (11.2-15.7)  gm/dl


 


Hct  31.8 L   (34.1-44.9)  %


 


MCV  97.8 H   (79.4-94.8)  fl


 


MCH  31.4   (25.6-32.2)  pg


 


MCHC  32.1 L   (32.2-35.5)  g/dl


 


RDW Std Deviation  46.3   (36.4-46.3)  fL


 


Plt Count  166 L   (182-369)  K/mm3


 


MPV  11.3   (9.4-12.3)  fl


 


Neut % (Auto)  51.7   (34.0-71.1)  %


 


Lymph % (Auto)  32.9   (19.3-51.7)  %


 


Mono % (Auto)  10.3   (4.7-12.5)  %


 


Eos % (Auto)  4.9   (0.7-5.8)  


 


Baso % (Auto)  0.2   (0.1-1.2)  %


 


Neut # (Auto)  2.31   (1.56-6.13)  K/mm3


 


Lymph # (Auto)  1.47   (1.18-3.74)  K/mm3


 


Mono # (Auto)  0.46 H   (0.24-0.36)  K/mm3


 


Eos # (Auto)  0.22   (0.04-0.36)  K/mm3


 


Baso # (Auto)  0.01   (0.01-0.08)  K/mm3


 


Sodium   144  (136-145)  mEq/L


 


Potassium   4.0  (3.5-5.1)  mEq/L


 


Chloride   110 H  ()  mEq/L


 


Carbon Dioxide   30  (21-32)  mEq/L


 


Anion Gap   8.0  (5-15)  


 


BUN   6 L  (7-18)  mg/dL


 


Creatinine   0.7  (0.55-1.02)  mg/dL


 


Est Cr Clr Drug Dosing   94.01  mL/min


 


Estimated GFR (MDRD)   > 60  (>60)  mL/min


 


BUN/Creatinine Ratio   8.6 L  (14-18)  


 


Glucose   88  (70-99)  mg/dL


 


Calcium   8.0 L  (8.5-10.1)  mg/dL


 


Magnesium   1.8  (1.8-2.4)  mg/dL


 


C-Reactive Protein   1.1 H*  (<1.0)  mg/dL











JEFFREY Results - Last 24 hrs: 


                                  Microbiology











 06/25/21 09:40 Urine Culture - Final





 Urine 











Med Orders - Current: 


                               Current Medications





Acetaminophen (Acetaminophen 325 Mg Tab)  650 mg PO Q4H PRN


   PRN Reason: Pain (Mild 1-3)/fever


Fluconazole (Fluconazole 150 Mg Tab)  150 mg PO ASDIRECTED HERNANDEZ


Hydromorphone HCl (Hydromorphone 1 Mg/Ml Syringe)  1 mg IVPUSH Q2H PRN


   PRN Reason: Pain


   Last Admin: 06/26/21 17:45 Dose:  1 mg


   Documented by: 


Levofloxacin/Dextrose 500 mg/ (Premix)  100 mls @ 100 mls/hr IV Q24H HERNANDEZ


   Last Admin: 06/26/21 12:02 Dose:  100 mls/hr


   Documented by: 


Lorazepam (Lorazepam 1 Mg Tab)  1 mg PO Q6H PRN


   PRN Reason: Anxiety


Non-Formulary Medication (Dextroamphetamine/Amphetamine [Adderall 20 Mg Tablet])

 1 tab PO TID HERNANDEZ


Ondansetron HCl (Ondansetron 4 Mg/2 Ml Sdv)  4 mg IV Q6H PRN


   PRN Reason: Nausea/Vomiting


   Last Admin: 06/26/21 17:36 Dose:  4 mg


   Documented by: 


Oxycodone HCl (Oxycodone 5 Mg Tab)  10 mg PO Q4H PRN


   PRN Reason: Pain (moderate 4-6)


   Last Admin: 06/27/21 05:25 Dose:  10 mg


   Documented by: 


Phenazopyridine HCl (Phenazopyridine 95 Mg Tab)  95 mg PO TIDPC [facility]


   Last Admin: 06/26/21 18:23 Dose:  95 mg


   Documented by: 


Polyethylene Glycol (Polyethylene Glycol 3350 Powder 17 Gm Packet)  17 gm PO BID

PRN


   PRN Reason: Constipation


   Last Admin: 06/27/21 05:25 Dose:  17 gm


   Documented by: 


Senna/Docusate Sodium (Docusate Sodium/Sennosides 50-8.6 Mg Tab)  1 tab PO BID 

[facility]


   Last Admin: 06/26/21 20:31 Dose:  1 tab


   Documented by: 





Discontinued Medications





Bisacodyl (Bisacodyl 10 Mg Supp)  10 mg RECTAL ONETIME ONE


   Stop: 06/25/21 16:16


   Last Admin: 06/25/21 16:09 Dose:  10 mg


   Documented by: 


Hydromorphone HCl (Hydromorphone 0.5 Mg/0.5 Ml Syringe)  0.5 mg IVPUSH ONETIME 

ONE


   Stop: 06/25/21 10:55


   Last Admin: 06/25/21 10:58 Dose:  0.5 mg


   Documented by: 


Hydromorphone HCl (Hydromorphone 0.5 Mg/0.5 Ml Syringe)  0.5 mg IVPUSH ONETIME 

ONE


   Stop: 06/25/21 12:30


   Last Admin: 06/25/21 12:36 Dose:  0.5 mg


   Documented by: 


Hydromorphone HCl (Hydromorphone 0.5 Mg/0.5 Ml Syringe)  0.5 mg IVPUSH Q2H PRN


   PRN Reason: Pain (severe 7-10)


   Last Admin: 06/26/21 06:42 Dose:  0.5 mg


   Documented by: 


Hydromorphone HCl (Hydromorphone 1 Mg/Ml Syringe)  1 mg IVPUSH ONETIME ONE


   Stop: 06/25/21 16:16


   Last Admin: 06/25/21 16:09 Dose:  1 mg


   Documented by: 


Sodium Chloride (Normal Saline)  1,000 mls @ 150 mls/hr IV ASDIRECTED HERNANDEZ


   Last Admin: 06/26/21 06:39 Dose:  150 mls/hr


   Documented by: 


Levofloxacin/Dextrose 500 mg/ (Premix)  100 mls @ 100 mls/hr IV ONETIME ONE


   Stop: 06/25/21 14:01


   Last Admin: 06/25/21 13:17 Dose:  100 mls/hr


   Documented by: 


Lorazepam (Lorazepam 0.5 Mg Tab)  0.5 mg PO BID PRN


   PRN Reason: Anxiety


Ondansetron HCl (Ondansetron 4 Mg/2 Ml Sdv)  4 mg IVPUSH ONETIME ONE


   Stop: 06/25/21 10:26


   Last Admin: 06/25/21 10:31 Dose:  4 mg


   Documented by: 











- Exam


Quality Assessment: Denies: Supplemental Oxygen, DVT Prophylaxis


General: Reports: Alert, Oriented, Cooperative, No Acute Distress


HEENT: Reports: Pupils Equal, Pupils Reactive, EOMI, Mucous Membr. Moist/Pink


Neck: Reports: Supple, Trachea Midline


Lungs: Reports: Clear to Auscultation, Normal Respiratory Effort


Cardiovascular: Reports: Regular Rate, Regular Rhythm


GI/Abdominal Exam: Normal Bowel Sounds, Soft, Non-Tender, No Distention


 (Female) Exam: Deferred


Rectal (Female) Exam: Deferred


Back Exam: Reports: Normal Inspection, Full Range of Motion


Extremities: Normal Inspection, Normal Range of Motion, No Pedal Edema


Skin: Reports: Warm, Dry, Intact


Neurological: Reports: No New Focal Deficit, Normal Gait, Normal Speech


Psy/Mental Status: Reports: Alert, Normal Affect, Normal Mood